# Patient Record
Sex: FEMALE | Race: WHITE
[De-identification: names, ages, dates, MRNs, and addresses within clinical notes are randomized per-mention and may not be internally consistent; named-entity substitution may affect disease eponyms.]

---

## 2018-01-04 ENCOUNTER — HOSPITAL ENCOUNTER (INPATIENT)
Dept: HOSPITAL 54 - ER | Age: 53
LOS: 5 days | Discharge: NURSING, REHAB OR LONG-TERM CARE | DRG: 481 | End: 2018-01-09
Attending: INTERNAL MEDICINE | Admitting: INTERNAL MEDICINE
Payer: COMMERCIAL

## 2018-01-04 VITALS — DIASTOLIC BLOOD PRESSURE: 99 MMHG | SYSTOLIC BLOOD PRESSURE: 145 MMHG

## 2018-01-04 VITALS — BODY MASS INDEX: 23.39 KG/M2 | WEIGHT: 137 LBS | HEIGHT: 64 IN
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
## 2018-01-04 DIAGNOSIS — F90.9: ICD-10-CM

## 2018-01-04 DIAGNOSIS — G89.29: ICD-10-CM
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
## 2018-01-04 DIAGNOSIS — N39.0: ICD-10-CM

## 2018-01-04 DIAGNOSIS — Z98.1: ICD-10-CM

## 2018-01-04 DIAGNOSIS — R79.89: ICD-10-CM

## 2018-01-04 DIAGNOSIS — D64.9: ICD-10-CM

## 2018-01-04 DIAGNOSIS — S72.144A: Primary | ICD-10-CM
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
## 2018-01-04 DIAGNOSIS — G82.20: ICD-10-CM

## 2018-01-04 DIAGNOSIS — F32.9: ICD-10-CM

## 2018-01-04 DIAGNOSIS — N31.9: ICD-10-CM

## 2018-01-04 DIAGNOSIS — F17.200: ICD-10-CM

## 2018-01-04 LAB
APTT PPP: 31 SEC (ref 23–34)
BASOPHILS # BLD AUTO: 0.1 /CMM (ref 0–0.2)
BASOPHILS NFR BLD AUTO: 1.5 % (ref 0–2)
BUN SERPL-MCNC: 6 MG/DL (ref 7–18)
CALCIUM SERPL-MCNC: 9.1 MG/DL (ref 8.5–10.1)
CHLORIDE SERPL-SCNC: 105 MMOL/L (ref 98–107)
CO2 SERPL-SCNC: 26 MMOL/L (ref 21–32)
CREAT SERPL-MCNC: 0.6 MG/DL (ref 0.6–1.3)
EOSINOPHIL # BLD AUTO: 0.2 /CMM (ref 0–0.7)
EOSINOPHIL NFR BLD AUTO: 2.1 % (ref 0–6)
GLUCOSE SERPL-MCNC: 119 MG/DL (ref 74–106)
HCT VFR BLD AUTO: 45 % (ref 33–45)
HGB BLD-MCNC: 15.1 G/DL (ref 11.5–14.8)
INR PPP: 1 (ref 0.87–1.13)
LYMPHOCYTES NFR BLD AUTO: 1.6 /CMM (ref 0.8–4.8)
LYMPHOCYTES NFR BLD AUTO: 17.3 % (ref 20–44)
MCH RBC QN AUTO: 30 PG (ref 26–33)
MCHC RBC AUTO-ENTMCNC: 34 G/DL (ref 31–36)
MCV RBC AUTO: 89 FL (ref 82–100)
MONOCYTES NFR BLD AUTO: 0.4 /CMM (ref 0.1–1.3)
MONOCYTES NFR BLD AUTO: 4.2 % (ref 2–12)
NEUTROPHILS # BLD AUTO: 7.1 /CMM (ref 1.8–8.9)
NEUTROPHILS NFR BLD AUTO: 74.9 % (ref 43–81)
PH UR STRIP: 8 [PH] (ref 5–8)
PLATELET # BLD AUTO: 195 /CMM (ref 150–450)
POTASSIUM SERPL-SCNC: 4 MMOL/L (ref 3.5–5.1)
PROTHROMBIN TIME: 10.4 SECS (ref 9.5–12.7)
RBC # BLD AUTO: 4.99 MIL/UL (ref 4–5.2)
RBC #/AREA URNS HPF: (no result) /HPF (ref 0–2)
RDW COEFFICIENT OF VARIATION: 13.4 (ref 11.5–15)
SODIUM SERPL-SCNC: 139 MMOL/L (ref 136–145)
UROBILINOGEN UR STRIP-MCNC: 0.2 EU/DL
WBC #/AREA URNS HPF: (no result) /HPF (ref 0–3)
WBC NRBC COR # BLD AUTO: 9.4 K/UL (ref 4.3–11)

## 2018-01-04 PROCEDURE — A4606 OXYGEN PROBE USED W OXIMETER: HCPCS

## 2018-01-04 PROCEDURE — A6402 STERILE GAUZE <= 16 SQ IN: HCPCS

## 2018-01-04 PROCEDURE — A6209 FOAM DRSG <=16 SQ IN W/O BDR: HCPCS

## 2018-01-04 PROCEDURE — C1713 ANCHOR/SCREW BN/BN,TIS/BN: HCPCS

## 2018-01-04 PROCEDURE — Z7610: HCPCS

## 2018-01-04 PROCEDURE — A4216 STERILE WATER/SALINE, 10 ML: HCPCS

## 2018-01-04 RX ADMIN — NICOTINE SCH MG: 21 PATCH TRANSDERMAL at 23:30

## 2018-01-04 NOTE — NUR
RN NOTES

RECEIVED PATIENT FROM ER FOR DX RIGHT HIP FRACTURE. PATIENT AO X 3, ABLE TO MAKE NEEDS 
KNOWN. NO ACUTE DISTRESS NOTED. PAIN 8/10 ON RIGHT HIP WHEN DURING TRANSFER ONTO BED. IV 
SITE PATENT, INTACT; FLUSHED. SUH CATHETER PATENT, INTACT; DRAINING CLEAR, YELLOW URINE. 
ON LOW BED WITH BILATERAL UPPER SIDE RAILS UP. CALL BELL WITHIN EASY REACH. WAITING FOR 
ADMISSION ORDERS. WILL CONTINUE TO MONITOR PATIENT.

## 2018-01-05 VITALS — SYSTOLIC BLOOD PRESSURE: 118 MMHG | DIASTOLIC BLOOD PRESSURE: 72 MMHG

## 2018-01-05 VITALS — SYSTOLIC BLOOD PRESSURE: 136 MMHG | DIASTOLIC BLOOD PRESSURE: 84 MMHG

## 2018-01-05 VITALS — SYSTOLIC BLOOD PRESSURE: 114 MMHG | DIASTOLIC BLOOD PRESSURE: 70 MMHG

## 2018-01-05 LAB
BASOPHILS # BLD AUTO: 0 /CMM (ref 0–0.2)
BASOPHILS NFR BLD AUTO: 0.3 % (ref 0–2)
BUN SERPL-MCNC: 6 MG/DL (ref 7–18)
CALCIUM SERPL-MCNC: 8.9 MG/DL (ref 8.5–10.1)
CHLORIDE SERPL-SCNC: 105 MMOL/L (ref 98–107)
CHOLEST SERPL-MCNC: 135 MG/DL (ref ?–200)
CO2 SERPL-SCNC: 26 MMOL/L (ref 21–32)
CREAT SERPL-MCNC: 0.6 MG/DL (ref 0.6–1.3)
EOSINOPHIL # BLD AUTO: 0.3 /CMM (ref 0–0.7)
EOSINOPHIL NFR BLD AUTO: 4.7 % (ref 0–6)
GLUCOSE SERPL-MCNC: 128 MG/DL (ref 74–106)
HCT VFR BLD AUTO: 39 % (ref 33–45)
HDLC SERPL-MCNC: 34 MG/DL (ref 40–60)
HGB BLD-MCNC: 13.5 G/DL (ref 11.5–14.8)
INR PPP: 0.98 (ref 0.87–1.13)
LDLC SERPL DIRECT ASSAY-MCNC: 90 MG/DL (ref 0–99)
LYMPHOCYTES NFR BLD AUTO: 2.3 /CMM (ref 0.8–4.8)
LYMPHOCYTES NFR BLD AUTO: 33 % (ref 20–44)
MAGNESIUM SERPL-MCNC: 1.7 MG/DL (ref 1.8–2.4)
MAGNESIUM SERPL-MCNC: 1.8 MG/DL (ref 1.8–2.4)
MCH RBC QN AUTO: 31 PG (ref 26–33)
MCHC RBC AUTO-ENTMCNC: 35 G/DL (ref 31–36)
MCV RBC AUTO: 90 FL (ref 82–100)
MONOCYTES NFR BLD AUTO: 0.5 /CMM (ref 0.1–1.3)
MONOCYTES NFR BLD AUTO: 7.8 % (ref 2–12)
NEUTROPHILS # BLD AUTO: 3.8 /CMM (ref 1.8–8.9)
NEUTROPHILS NFR BLD AUTO: 54.2 % (ref 43–81)
PHOSPHATE SERPL-MCNC: 3.9 MG/DL (ref 2.5–4.9)
PHOSPHATE SERPL-MCNC: 4 MG/DL (ref 2.5–4.9)
PLATELET # BLD AUTO: 168 /CMM (ref 150–450)
POTASSIUM SERPL-SCNC: 3.8 MMOL/L (ref 3.5–5.1)
PROTHROMBIN TIME: 10.2 SECS (ref 9.5–12.7)
RBC # BLD AUTO: 4.33 MIL/UL (ref 4–5.2)
RDW COEFFICIENT OF VARIATION: 14.7 (ref 11.5–15)
SODIUM SERPL-SCNC: 140 MMOL/L (ref 136–145)
TRIGL SERPL-MCNC: 96 MG/DL (ref 30–150)
TSH SERPL DL<=0.005 MIU/L-ACNC: 1.88 UIU/ML (ref 0.36–3.74)
WBC NRBC COR # BLD AUTO: 7.1 K/UL (ref 4.3–11)

## 2018-01-05 RX ADMIN — TIZANIDINE HYDROCHLORIDE SCH MG: 4 TABLET ORAL at 09:02

## 2018-01-05 RX ADMIN — Medication SCH MG: at 09:01

## 2018-01-05 RX ADMIN — HYDROMORPHONE HYDROCHLORIDE PRN MG: 1 INJECTION, SOLUTION INTRAMUSCULAR; INTRAVENOUS; SUBCUTANEOUS at 23:11

## 2018-01-05 RX ADMIN — BACLOFEN SCH MG: 10 TABLET ORAL at 17:05

## 2018-01-05 RX ADMIN — HYDROMORPHONE HYDROCHLORIDE PRN MG: 1 INJECTION, SOLUTION INTRAMUSCULAR; INTRAVENOUS; SUBCUTANEOUS at 02:10

## 2018-01-05 RX ADMIN — Medication SCH MG: at 17:05

## 2018-01-05 RX ADMIN — BACLOFEN SCH MG: 10 TABLET ORAL at 12:35

## 2018-01-05 RX ADMIN — BACLOFEN SCH MG: 10 TABLET ORAL at 09:01

## 2018-01-05 RX ADMIN — MAGNESIUM SULFATE IN DEXTROSE SCH MLS/HR: 10 INJECTION, SOLUTION INTRAVENOUS at 17:10

## 2018-01-05 RX ADMIN — DEXTROSE MONOHYDRATE SCH MLS/HR: 50 INJECTION, SOLUTION INTRAVENOUS at 22:54

## 2018-01-05 RX ADMIN — HYDROMORPHONE HYDROCHLORIDE PRN MG: 1 INJECTION, SOLUTION INTRAMUSCULAR; INTRAVENOUS; SUBCUTANEOUS at 06:10

## 2018-01-05 RX ADMIN — HYDROMORPHONE HYDROCHLORIDE PRN MG: 1 INJECTION, SOLUTION INTRAMUSCULAR; INTRAVENOUS; SUBCUTANEOUS at 13:01

## 2018-01-05 RX ADMIN — MAGNESIUM SULFATE IN DEXTROSE SCH MLS/HR: 10 INJECTION, SOLUTION INTRAVENOUS at 16:15

## 2018-01-05 RX ADMIN — PANTOPRAZOLE SODIUM SCH MG: 40 TABLET, DELAYED RELEASE ORAL at 09:00

## 2018-01-05 RX ADMIN — THERA TABS SCH UDTAB: TAB at 09:01

## 2018-01-05 RX ADMIN — HYDROMORPHONE HYDROCHLORIDE PRN MG: 1 INJECTION, SOLUTION INTRAMUSCULAR; INTRAVENOUS; SUBCUTANEOUS at 17:05

## 2018-01-05 RX ADMIN — HYDROMORPHONE HYDROCHLORIDE PRN MG: 1 INJECTION, SOLUTION INTRAMUSCULAR; INTRAVENOUS; SUBCUTANEOUS at 19:08

## 2018-01-05 RX ADMIN — NICOTINE SCH MG: 21 PATCH TRANSDERMAL at 09:02

## 2018-01-05 RX ADMIN — TIZANIDINE HYDROCHLORIDE SCH MG: 4 TABLET ORAL at 12:35

## 2018-01-05 RX ADMIN — TIZANIDINE HYDROCHLORIDE SCH MG: 4 TABLET ORAL at 17:05

## 2018-01-05 RX ADMIN — HYDROMORPHONE HYDROCHLORIDE PRN MG: 1 INJECTION, SOLUTION INTRAMUSCULAR; INTRAVENOUS; SUBCUTANEOUS at 15:07

## 2018-01-05 RX ADMIN — HYDROMORPHONE HYDROCHLORIDE PRN MG: 1 INJECTION, SOLUTION INTRAMUSCULAR; INTRAVENOUS; SUBCUTANEOUS at 21:13

## 2018-01-05 RX ADMIN — HYDROMORPHONE HYDROCHLORIDE PRN MG: 1 INJECTION, SOLUTION INTRAMUSCULAR; INTRAVENOUS; SUBCUTANEOUS at 10:12

## 2018-01-05 NOTE — NUR
ms/rn notes

patient reported pain level of 8/10 on right hip will give as needed pain med, will continue 
to monitor.

## 2018-01-05 NOTE — NUR
RN MS INITIAL NOTES



RECEIVED PT LAYING IN BED. A/OX4, AWAKE ALERT AND RESPONSIVE. RESPIRATIONS ARE EVEN AND 
UNLABORED, NOT IN ANY ACUTE DISTRESS NOTED. C/O PAIN TO RIGHT HIP, WAITING FOR PHARMACY TO 
VERIFIY MEDICATIONS. PT IS AWARE. WILL CONTINUE TO MONITOR AND REPOSITION FOR COMFORT. 
REMINDED PT TO USE CALL LIGHT WHEN ASSISTANCE IS NEEDED. CALL LIGHT IS LEFT WITHIN REACH OF 
PT.

## 2018-01-05 NOTE — NUR
ms/rn notes

PATIENT PAIN MANAGEMENT MONITORING, ALERT, ORIENTED X3, ABLE TO VERBALIZE NEEDS, REPORTED 
PAIN LEVEL OF 8/10 , ADMINISTER DILAUDID IVP WITH ORDERED DOSE, WILL MONITOR EFFECTIVENESS.

## 2018-01-05 NOTE — NUR
MS/RN NOTES

PATIENT REPORTED NICOTINE PATCH REMOVED ACCIDENTALLY APPEARED AGITATED AND REQUESTING FOR 
MED TO BE APPLIED, MD CONTACTED AND ORDER FOR A ONE TIME PATCH TO BE GIVEN. ORDER TO CARRIED 
OUT/

## 2018-01-05 NOTE — NUR
RN NOTES

PATIENT WITH EYES CLOSED, AROUSABLE. RESPIRATIONS EVEN. DILAUDID IV GIVEN FOR RIGHT HIP 
PAIN. DUE MED GIVEN WITH NO ASE NOTED. NEEDS ATTENDED. HIP PRECAUTION MAINTAINED. SAFETY 
PRECAUTIONS AND COMFORT MEASURES IN PLACE. WILL GIVE REPORT TO DAY SHIFT FOR CONTINUITY OF 
CARE.

## 2018-01-05 NOTE — NUR
ms/rn opening notes

received report from am rn, patient, alert, oriented x3, able to verbalize needs, 
respirations even and unlabored, skin warm to touch, require assistance at al times. 
informed about procedure at 7am in am, consent form has been signed, check list will be 
provided. bed in lock position. Call lights within reach. Will continue to monitor.

## 2018-01-05 NOTE — NUR
RN MS CLOSING NOTES



KARY GAN PUT ORDERS FOR SURGERY TOMORROW FOR RIGHT HIP INTRAMEDULLARY RODDING. PT SIGNED 
CONSENTS AND IS EXCITED FOR SURGERY TOMORROW. ALL DUE MEDS GIVEN, NEEDS MET AND RENDERED. 
RESPIRATIONS ARE EVEN AND UNLABORED, NOT IN ANY ACUTE DISTRESS NOTED. IV TO LEFT WRIST IS 
PATENT, DRESSING KEPT CLEAN AND DRY. MAGNESIUM REPLACEMENT DONE WITH NO ASE NOTED. PT KEPT 
COMFORTABLE, UNABLE TO MOVE A LOT DUE TO RIGHT HIP FX. PT STATED SHE IS HAPPY WITH CARE AND 
CANT WAIT TO GO HOME. ENDORSED TO NEXT SHIFT FOR CONTINTUITY OF CARE.

## 2018-01-05 NOTE — NUR
RN NOTES



PT REQUESTED FOR ADDERALL, PHARMACY DOES NOT DISPENSE ADDERALL. SPOKE W/ DR. HERNANDEZ AND 
STATED THAT "PATIENT WILL NEED TO BRING HER HOME MEDICATION." PATIENT MADE AWARE AND STATED 
"THATS OKAY. I WILL KEEP MY ADDERALL AT HOME." ALVERTO PRESTON OF DR. THAO CAME IN TO SEE THE 
PATIENT AND STATED HE WILL PUT ORDERS FOR SURGERY TOMORROW. PT MADE AWARE AND AGREED.

## 2018-01-06 VITALS — SYSTOLIC BLOOD PRESSURE: 120 MMHG | DIASTOLIC BLOOD PRESSURE: 80 MMHG

## 2018-01-06 VITALS — SYSTOLIC BLOOD PRESSURE: 119 MMHG | DIASTOLIC BLOOD PRESSURE: 80 MMHG

## 2018-01-06 VITALS — DIASTOLIC BLOOD PRESSURE: 56 MMHG | SYSTOLIC BLOOD PRESSURE: 97 MMHG

## 2018-01-06 VITALS — DIASTOLIC BLOOD PRESSURE: 83 MMHG | SYSTOLIC BLOOD PRESSURE: 121 MMHG

## 2018-01-06 VITALS — DIASTOLIC BLOOD PRESSURE: 85 MMHG | SYSTOLIC BLOOD PRESSURE: 121 MMHG

## 2018-01-06 VITALS — SYSTOLIC BLOOD PRESSURE: 100 MMHG | DIASTOLIC BLOOD PRESSURE: 57 MMHG

## 2018-01-06 VITALS — SYSTOLIC BLOOD PRESSURE: 121 MMHG | DIASTOLIC BLOOD PRESSURE: 85 MMHG

## 2018-01-06 VITALS — SYSTOLIC BLOOD PRESSURE: 123 MMHG | DIASTOLIC BLOOD PRESSURE: 85 MMHG

## 2018-01-06 VITALS — SYSTOLIC BLOOD PRESSURE: 118 MMHG | DIASTOLIC BLOOD PRESSURE: 79 MMHG

## 2018-01-06 LAB
BUN SERPL-MCNC: 5 MG/DL (ref 7–18)
CALCIUM SERPL-MCNC: 8.8 MG/DL (ref 8.5–10.1)
CHLORIDE SERPL-SCNC: 103 MMOL/L (ref 98–107)
CO2 SERPL-SCNC: 25 MMOL/L (ref 21–32)
CREAT SERPL-MCNC: 0.5 MG/DL (ref 0.6–1.3)
GLUCOSE SERPL-MCNC: 107 MG/DL (ref 74–106)
HCT VFR BLD AUTO: 39 % (ref 33–45)
HGB BLD-MCNC: 13.3 G/DL (ref 11.5–14.8)
MAGNESIUM SERPL-MCNC: 1.9 MG/DL (ref 1.8–2.4)
POTASSIUM SERPL-SCNC: 3.6 MMOL/L (ref 3.5–5.1)
SODIUM SERPL-SCNC: 139 MMOL/L (ref 136–145)

## 2018-01-06 PROCEDURE — 0QS604Z REPOSITION RIGHT UPPER FEMUR WITH INTERNAL FIXATION DEVICE, OPEN APPROACH: ICD-10-PCS

## 2018-01-06 RX ADMIN — Medication PRN MG: at 16:24

## 2018-01-06 RX ADMIN — HYDROMORPHONE HYDROCHLORIDE PRN MG: 2 INJECTION, SOLUTION INTRAMUSCULAR; INTRAVENOUS; SUBCUTANEOUS at 19:57

## 2018-01-06 RX ADMIN — HYDROMORPHONE HYDROCHLORIDE PRN MG: 2 INJECTION, SOLUTION INTRAMUSCULAR; INTRAVENOUS; SUBCUTANEOUS at 10:48

## 2018-01-06 RX ADMIN — Medication PRN MG: at 19:57

## 2018-01-06 RX ADMIN — Medication SCH MG: at 16:23

## 2018-01-06 RX ADMIN — HYDROMORPHONE HYDROCHLORIDE PRN MG: 1 INJECTION, SOLUTION INTRAMUSCULAR; INTRAVENOUS; SUBCUTANEOUS at 01:09

## 2018-01-06 RX ADMIN — BACLOFEN SCH MG: 10 TABLET ORAL at 12:02

## 2018-01-06 RX ADMIN — HYDROMORPHONE HYDROCHLORIDE PRN MG: 2 INJECTION, SOLUTION INTRAMUSCULAR; INTRAVENOUS; SUBCUTANEOUS at 22:30

## 2018-01-06 RX ADMIN — TIZANIDINE HYDROCHLORIDE SCH MG: 4 TABLET ORAL at 12:03

## 2018-01-06 RX ADMIN — DEXTROSE MONOHYDRATE SCH MLS/HR: 50 INJECTION, SOLUTION INTRAVENOUS at 16:50

## 2018-01-06 RX ADMIN — Medication PRN MG: at 22:38

## 2018-01-06 RX ADMIN — Medication SCH MG: at 10:49

## 2018-01-06 RX ADMIN — DEXTROSE MONOHYDRATE SCH MLS/HR: 50 INJECTION, SOLUTION INTRAVENOUS at 22:38

## 2018-01-06 RX ADMIN — Medication SCH MG: at 09:00

## 2018-01-06 RX ADMIN — TIZANIDINE HYDROCHLORIDE SCH MG: 4 TABLET ORAL at 09:27

## 2018-01-06 RX ADMIN — Medication PRN MG: at 09:34

## 2018-01-06 RX ADMIN — THERA TABS SCH UDTAB: TAB at 10:49

## 2018-01-06 RX ADMIN — HYDROMORPHONE HYDROCHLORIDE PRN MG: 2 INJECTION, SOLUTION INTRAMUSCULAR; INTRAVENOUS; SUBCUTANEOUS at 15:22

## 2018-01-06 RX ADMIN — TIZANIDINE HYDROCHLORIDE SCH MG: 4 TABLET ORAL at 16:23

## 2018-01-06 RX ADMIN — NICOTINE SCH MG: 21 PATCH TRANSDERMAL at 10:49

## 2018-01-06 RX ADMIN — BACLOFEN SCH MG: 10 TABLET ORAL at 16:23

## 2018-01-06 RX ADMIN — PANTOPRAZOLE SODIUM SCH MG: 40 TABLET, DELAYED RELEASE ORAL at 10:49

## 2018-01-06 RX ADMIN — BACLOFEN SCH MG: 10 TABLET ORAL at 09:28

## 2018-01-06 NOTE — NUR
RECEIVED A CALL THAT THE PATIENT IS POSITIVE FOR MRSA OF THE NARES. CALLED MD AND PLACED 
ORDER FOR BACTROBAN OINT. PT PLACED ON ISOLATION.

## 2018-01-06 NOTE — NUR
MSRN

MULTIPLE NEEDS, ON FREQ PAIN MED.  STATED INCREASING ANXIETY WHEN PAIN MED NOT GIVEN ON 
TIME.  ABLE TO POSITION SELF PER COMFORT WITH MIN ASSIST.  OTHER DUE MEDS GIVEN. INSISTED TO 
HAVE ANOTHER DOSE OF ZANAFLEX AND BACLOFEN.  ORDERS RECEIVED FROM DR. LELA TUCKER.

## 2018-01-06 NOTE — NUR
ms/rn notes

OR NURSE CAME AND  PATIENT IN BED, PREPARED CHECKLIST, CITAL SIGNS CHECK, B/P 130/73, 
PULSE 92, O2 SAT ROOM AIR AT 96%, TEMP AT 97.8, VERBALIZED PAIN OF 8/10. , SUH EMPTIED, 
BELONGINGS,KEPT BY BEDSIDE,  TO . PATIENT ALERT, ORIENTED. ASSISTED FOR 
SAFETY.

## 2018-01-06 NOTE — NUR
MSRN

FULLY AWAKE,  AT BEDSIDE.  PLAN OF CARE AND MEDICATION REGIMEN [place] PAIN MGT DISCUSSED 
WITH PATIENT, APPEARS TO UNDERSTAND.  WILL CALL IF NEEDED.  HL PATENT.  TO CONTINUE.  RIGHT 
HIP DRESSING D/I. FC TO GRAVITY GOOD OUTPUT

## 2018-01-06 NOTE — NUR
CALLED PHARMACY AS PT CLINDAMYCIN IS MISSING. PHARMACIST PAULA STATES SHE WILL SEND THE 
MEDICATION.

## 2018-01-06 NOTE — NUR
NO SIGNIFICANT CHANGES IN PATIENT CONDITION THROUGHOUT THE SHIFT. NO SOB OR DISTRESS NOTED 
AT THIS TIME. PATIENT REPORTS TOLERABLE PAIN. BED IN A LOW POSITION, CALL LIGHT WITHIN 
PATIENT REACH. WILL ENDORSE FOR LAVONNE.

## 2018-01-06 NOTE — NUR
PT RECEIVED FROM SURGERY. NO SOB OR DISTRESS NOTED AT THIS TIME. PATIENT REPORTS 10/10 PAIN. 
OR GAVE ALL MEDICATIONS THEY WERE ABLE. WILL FAX RX ORDERS AND FOLLOW UP AS SOON AS 
POSSIBLE. VITALS CHECKED AND RECORDED. WILL CONTINUE TO MONITOR.

## 2018-01-07 VITALS — SYSTOLIC BLOOD PRESSURE: 90 MMHG | DIASTOLIC BLOOD PRESSURE: 57 MMHG

## 2018-01-07 VITALS — SYSTOLIC BLOOD PRESSURE: 95 MMHG | DIASTOLIC BLOOD PRESSURE: 47 MMHG

## 2018-01-07 LAB
BASOPHILS # BLD AUTO: 0 /CMM (ref 0–0.2)
BASOPHILS NFR BLD AUTO: 0.4 % (ref 0–2)
BUN SERPL-MCNC: 7 MG/DL (ref 7–18)
CALCIUM SERPL-MCNC: 8.1 MG/DL (ref 8.5–10.1)
CHLORIDE SERPL-SCNC: 107 MMOL/L (ref 98–107)
CO2 SERPL-SCNC: 24 MMOL/L (ref 21–32)
CREAT SERPL-MCNC: 0.5 MG/DL (ref 0.6–1.3)
EOSINOPHIL # BLD AUTO: 0.2 /CMM (ref 0–0.7)
EOSINOPHIL NFR BLD AUTO: 2.3 % (ref 0–6)
GLUCOSE SERPL-MCNC: 126 MG/DL (ref 74–106)
HCT VFR BLD AUTO: 26 % (ref 33–45)
HGB BLD-MCNC: 9 G/DL (ref 11.5–14.8)
LYMPHOCYTES NFR BLD AUTO: 2.6 /CMM (ref 0.8–4.8)
LYMPHOCYTES NFR BLD AUTO: 28.9 % (ref 20–44)
MCH RBC QN AUTO: 31 PG (ref 26–33)
MCHC RBC AUTO-ENTMCNC: 35 G/DL (ref 31–36)
MCV RBC AUTO: 91 FL (ref 82–100)
MONOCYTES NFR BLD AUTO: 0.8 /CMM (ref 0.1–1.3)
MONOCYTES NFR BLD AUTO: 9.1 % (ref 2–12)
NEUTROPHILS # BLD AUTO: 5.3 /CMM (ref 1.8–8.9)
NEUTROPHILS NFR BLD AUTO: 59.3 % (ref 43–81)
PLATELET # BLD AUTO: 131 /CMM (ref 150–450)
POTASSIUM SERPL-SCNC: 3.6 MMOL/L (ref 3.5–5.1)
RBC # BLD AUTO: 2.88 MIL/UL (ref 4–5.2)
RDW COEFFICIENT OF VARIATION: 13.9 (ref 11.5–15)
SODIUM SERPL-SCNC: 138 MMOL/L (ref 136–145)
WBC NRBC COR # BLD AUTO: 9 K/UL (ref 4.3–11)

## 2018-01-07 RX ADMIN — BACLOFEN SCH MG: 10 TABLET ORAL at 00:10

## 2018-01-07 RX ADMIN — MUPIROCIN SCH GM: 20 OINTMENT TOPICAL at 00:07

## 2018-01-07 RX ADMIN — HYDROMORPHONE HYDROCHLORIDE PRN MG: 2 INJECTION, SOLUTION INTRAMUSCULAR; INTRAVENOUS; SUBCUTANEOUS at 08:37

## 2018-01-07 RX ADMIN — PANTOPRAZOLE SODIUM SCH MG: 40 TABLET, DELAYED RELEASE ORAL at 08:43

## 2018-01-07 RX ADMIN — Medication PRN MG: at 10:05

## 2018-01-07 RX ADMIN — RIVAROXABAN SCH MG: 10 TABLET, FILM COATED ORAL at 08:46

## 2018-01-07 RX ADMIN — HYDROMORPHONE HYDROCHLORIDE PRN MG: 2 INJECTION, SOLUTION INTRAMUSCULAR; INTRAVENOUS; SUBCUTANEOUS at 16:36

## 2018-01-07 RX ADMIN — HYDROMORPHONE HYDROCHLORIDE PRN MG: 2 INJECTION, SOLUTION INTRAMUSCULAR; INTRAVENOUS; SUBCUTANEOUS at 12:35

## 2018-01-07 RX ADMIN — TIZANIDINE HYDROCHLORIDE SCH MG: 4 TABLET ORAL at 08:37

## 2018-01-07 RX ADMIN — Medication PRN MG: at 17:49

## 2018-01-07 RX ADMIN — THERA TABS SCH UDTAB: TAB at 08:37

## 2018-01-07 RX ADMIN — Medication PRN MG: at 20:53

## 2018-01-07 RX ADMIN — SODIUM CHLORIDE SCH MLS/HR: 9 INJECTION, SOLUTION INTRAVENOUS at 14:33

## 2018-01-07 RX ADMIN — Medication SCH MG: at 08:38

## 2018-01-07 RX ADMIN — MUPIROCIN SCH GM: 20 OINTMENT TOPICAL at 21:02

## 2018-01-07 RX ADMIN — HYDROMORPHONE HYDROCHLORIDE PRN MG: 2 INJECTION, SOLUTION INTRAMUSCULAR; INTRAVENOUS; SUBCUTANEOUS at 02:01

## 2018-01-07 RX ADMIN — TIZANIDINE HYDROCHLORIDE SCH MG: 4 TABLET ORAL at 12:35

## 2018-01-07 RX ADMIN — DEXTROSE MONOHYDRATE SCH MLS/HR: 50 INJECTION, SOLUTION INTRAVENOUS at 22:53

## 2018-01-07 RX ADMIN — MUPIROCIN SCH GM: 20 OINTMENT TOPICAL at 08:42

## 2018-01-07 RX ADMIN — BACLOFEN SCH MG: 10 TABLET ORAL at 08:46

## 2018-01-07 RX ADMIN — DEXTROSE MONOHYDRATE SCH MLS/HR: 50 INJECTION, SOLUTION INTRAVENOUS at 00:00

## 2018-01-07 RX ADMIN — Medication SCH MG: at 16:35

## 2018-01-07 RX ADMIN — NICOTINE SCH MG: 21 PATCH TRANSDERMAL at 08:43

## 2018-01-07 RX ADMIN — HYDROMORPHONE HYDROCHLORIDE PRN MG: 2 INJECTION, SOLUTION INTRAMUSCULAR; INTRAVENOUS; SUBCUTANEOUS at 18:51

## 2018-01-07 RX ADMIN — BACLOFEN SCH MG: 10 TABLET ORAL at 20:53

## 2018-01-07 RX ADMIN — BACLOFEN SCH MG: 10 TABLET ORAL at 12:35

## 2018-01-07 RX ADMIN — DEXTROSE MONOHYDRATE SCH MLS/HR: 50 INJECTION, SOLUTION INTRAVENOUS at 10:05

## 2018-01-07 RX ADMIN — Medication SCH MG: at 08:43

## 2018-01-07 RX ADMIN — HYDROMORPHONE HYDROCHLORIDE PRN MG: 2 INJECTION, SOLUTION INTRAMUSCULAR; INTRAVENOUS; SUBCUTANEOUS at 20:54

## 2018-01-07 RX ADMIN — BACLOFEN SCH MG: 10 TABLET ORAL at 16:35

## 2018-01-07 RX ADMIN — TIZANIDINE HYDROCHLORIDE SCH MG: 4 TABLET ORAL at 16:35

## 2018-01-07 RX ADMIN — HYDROMORPHONE HYDROCHLORIDE PRN MG: 2 INJECTION, SOLUTION INTRAMUSCULAR; INTRAVENOUS; SUBCUTANEOUS at 06:33

## 2018-01-07 NOTE — NUR
MSRN

IN GOOD SPIRITS, STATED EAGER TO SEE PHYSICAL THERAPIST IN AM AND WILL TRY TO INCREASE HER 
ACTIVITY AND PARTICIPATION.  HS CARE DONE, ABLE TO REPOSITION SELF FOR COMFORT.  ALL NEEDS 
MADE, CONTINUED.

## 2018-01-08 VITALS — DIASTOLIC BLOOD PRESSURE: 82 MMHG | SYSTOLIC BLOOD PRESSURE: 123 MMHG

## 2018-01-08 VITALS — DIASTOLIC BLOOD PRESSURE: 69 MMHG | SYSTOLIC BLOOD PRESSURE: 126 MMHG

## 2018-01-08 VITALS — DIASTOLIC BLOOD PRESSURE: 71 MMHG | SYSTOLIC BLOOD PRESSURE: 116 MMHG

## 2018-01-08 LAB
BASOPHILS # BLD AUTO: 0 /CMM (ref 0–0.2)
BASOPHILS NFR BLD AUTO: 0.3 % (ref 0–2)
BUN SERPL-MCNC: 5 MG/DL (ref 7–18)
CALCIUM SERPL-MCNC: 8.5 MG/DL (ref 8.5–10.1)
CHLORIDE SERPL-SCNC: 106 MMOL/L (ref 98–107)
CO2 SERPL-SCNC: 24 MMOL/L (ref 21–32)
CREAT SERPL-MCNC: 0.5 MG/DL (ref 0.6–1.3)
EOSINOPHIL # BLD AUTO: 0.3 /CMM (ref 0–0.7)
EOSINOPHIL NFR BLD AUTO: 4.2 % (ref 0–6)
GLUCOSE SERPL-MCNC: 116 MG/DL (ref 74–106)
HCT VFR BLD AUTO: 26 % (ref 33–45)
HGB BLD-MCNC: 9 G/DL (ref 11.5–14.8)
LYMPHOCYTES NFR BLD AUTO: 1.9 /CMM (ref 0.8–4.8)
LYMPHOCYTES NFR BLD AUTO: 24.9 % (ref 20–44)
MCH RBC QN AUTO: 31 PG (ref 26–33)
MCHC RBC AUTO-ENTMCNC: 35 G/DL (ref 31–36)
MCV RBC AUTO: 90 FL (ref 82–100)
MONOCYTES NFR BLD AUTO: 0.6 /CMM (ref 0.1–1.3)
MONOCYTES NFR BLD AUTO: 7.4 % (ref 2–12)
NEUTROPHILS # BLD AUTO: 4.8 /CMM (ref 1.8–8.9)
NEUTROPHILS NFR BLD AUTO: 63.2 % (ref 43–81)
PLATELET # BLD AUTO: 161 /CMM (ref 150–450)
POTASSIUM SERPL-SCNC: 3.6 MMOL/L (ref 3.5–5.1)
RBC # BLD AUTO: 2.87 MIL/UL (ref 4–5.2)
RDW COEFFICIENT OF VARIATION: 14 (ref 11.5–15)
SODIUM SERPL-SCNC: 140 MMOL/L (ref 136–145)
WBC NRBC COR # BLD AUTO: 7.7 K/UL (ref 4.3–11)

## 2018-01-08 RX ADMIN — THERA TABS SCH UDTAB: TAB at 08:30

## 2018-01-08 RX ADMIN — Medication SCH MG: at 08:30

## 2018-01-08 RX ADMIN — HYDROMORPHONE HYDROCHLORIDE PRN MG: 2 INJECTION, SOLUTION INTRAMUSCULAR; INTRAVENOUS; SUBCUTANEOUS at 13:21

## 2018-01-08 RX ADMIN — RIVAROXABAN SCH MG: 10 TABLET, FILM COATED ORAL at 17:55

## 2018-01-08 RX ADMIN — Medication PRN MG: at 20:38

## 2018-01-08 RX ADMIN — DEXTROSE MONOHYDRATE SCH MLS/HR: 50 INJECTION, SOLUTION INTRAVENOUS at 22:41

## 2018-01-08 RX ADMIN — TIZANIDINE HYDROCHLORIDE SCH MG: 4 TABLET ORAL at 08:30

## 2018-01-08 RX ADMIN — Medication PRN MG: at 00:08

## 2018-01-08 RX ADMIN — HYDROMORPHONE HYDROCHLORIDE PRN MG: 2 INJECTION, SOLUTION INTRAMUSCULAR; INTRAVENOUS; SUBCUTANEOUS at 01:46

## 2018-01-08 RX ADMIN — HYDROMORPHONE HYDROCHLORIDE PRN MG: 2 INJECTION, SOLUTION INTRAMUSCULAR; INTRAVENOUS; SUBCUTANEOUS at 20:27

## 2018-01-08 RX ADMIN — TIZANIDINE HYDROCHLORIDE SCH MG: 4 TABLET ORAL at 13:21

## 2018-01-08 RX ADMIN — BACLOFEN SCH MG: 10 TABLET ORAL at 20:27

## 2018-01-08 RX ADMIN — Medication SCH MG: at 17:45

## 2018-01-08 RX ADMIN — HYDROMORPHONE HYDROCHLORIDE PRN MG: 2 INJECTION, SOLUTION INTRAMUSCULAR; INTRAVENOUS; SUBCUTANEOUS at 17:45

## 2018-01-08 RX ADMIN — TIZANIDINE HYDROCHLORIDE SCH MG: 4 TABLET ORAL at 17:45

## 2018-01-08 RX ADMIN — BACLOFEN SCH MG: 10 TABLET ORAL at 08:30

## 2018-01-08 RX ADMIN — SODIUM CHLORIDE SCH MLS/HR: 9 INJECTION, SOLUTION INTRAVENOUS at 15:17

## 2018-01-08 RX ADMIN — MUPIROCIN SCH APPLIC: 20 OINTMENT TOPICAL at 20:33

## 2018-01-08 RX ADMIN — HYDROMORPHONE HYDROCHLORIDE PRN MG: 2 INJECTION, SOLUTION INTRAMUSCULAR; INTRAVENOUS; SUBCUTANEOUS at 11:03

## 2018-01-08 RX ADMIN — Medication PRN MG: at 21:43

## 2018-01-08 RX ADMIN — HYDROMORPHONE HYDROCHLORIDE PRN MG: 2 INJECTION, SOLUTION INTRAMUSCULAR; INTRAVENOUS; SUBCUTANEOUS at 22:41

## 2018-01-08 RX ADMIN — BACLOFEN SCH MG: 10 TABLET ORAL at 13:20

## 2018-01-08 RX ADMIN — PANTOPRAZOLE SODIUM SCH MG: 40 TABLET, DELAYED RELEASE ORAL at 08:30

## 2018-01-08 RX ADMIN — BACLOFEN SCH MG: 10 TABLET ORAL at 17:45

## 2018-01-08 RX ADMIN — HYDROMORPHONE HYDROCHLORIDE PRN MG: 2 INJECTION, SOLUTION INTRAMUSCULAR; INTRAVENOUS; SUBCUTANEOUS at 08:31

## 2018-01-08 RX ADMIN — HYDROMORPHONE HYDROCHLORIDE PRN MG: 2 INJECTION, SOLUTION INTRAMUSCULAR; INTRAVENOUS; SUBCUTANEOUS at 15:24

## 2018-01-08 RX ADMIN — NICOTINE SCH MG: 21 PATCH TRANSDERMAL at 08:30

## 2018-01-08 RX ADMIN — MUPIROCIN SCH GM: 20 OINTMENT TOPICAL at 08:40

## 2018-01-08 NOTE — NUR
RN NOTES

PATIENT ALERT AND ORIENTED X4, NO DISTRESS NOTED, DENIES PAIN AT THIS TIME, PATIENT WORKED 
WITH PHYSICAL THERAPY TODAY. ALL NEEDS ATTENDED AND MET, PATIENT ABLE TO HELP WITH 
REPOSITIONING. CALL LIGHT WITHIN REACH, WILL ENDORSE TO NIGHT SHIFT FOR LAVONNE.

## 2018-01-08 NOTE — NUR
MSRN

SLEEPING APPEARS COMFORTABLE.  HAS BEEN COMPLAINING OF RIGHT HIP PAIN Q 2 TO 3 HOURS LAST 
NIGHT,  HAS BEEN MEDICATED WITH DILAUDED IV AND OXY IR IN BETWEEN.  LESS ANXIOUS FROM LAST 
NIGHT. CLOSELY WATCHED

## 2018-01-08 NOTE — NUR
MS RN NOTE:



PATIENT COMPLAINS OF PAIN TO RIGHT HIP/LEG, OXY IR 5MG ORAL GIVEN PER MD ORDER. PATIENT ALSO 
REQUEST FOR STOOL SOFTENER AND THAT NEEDS BOTH COLACE AND SENAKOT TOGETHER SINCE SHE HAS 
RECTAL PROLAPSE. COLACE AND SENAKOT ORAL GIVEN PER MD ORDER. WILL CONTINUE TO MONITOR.

## 2018-01-08 NOTE — NUR
MS RN NOTE:



PATIENT RESTING IN BED, NO ACUTE DISTRESS NOTED. BREATHING EVEN AND UNLABORED, NO SOB NOTED. 
IV TO LFA IN PLACE. SUH CATHETER IN PLACE DRAINING CLEAR YELLOW URINE. BED LOCKED AND IN 
LOWEST POSITION, CALL LIGHT IN REACH. WILL CONTINUE TO MONITOR.

## 2018-01-08 NOTE — NUR
MS RN NOTE:



PATIENT COMPLAINS OF PAIN TO RIGHT HIP/LEG, DILAUDID 1MG IV GIVEN PER MD ORDER. WILL 
CONTINUE TO MONITOR.

## 2018-01-09 ENCOUNTER — HOSPITAL ENCOUNTER (INPATIENT)
Dept: HOSPITAL 12 - REHAB | Age: 53
LOS: 27 days | Discharge: HOME HEALTH | DRG: 560 | End: 2018-02-05
Attending: PHYSICAL MEDICINE & REHABILITATION | Admitting: PHYSICAL MEDICINE & REHABILITATION
Payer: COMMERCIAL

## 2018-01-09 VITALS — SYSTOLIC BLOOD PRESSURE: 114 MMHG | DIASTOLIC BLOOD PRESSURE: 61 MMHG

## 2018-01-09 VITALS — SYSTOLIC BLOOD PRESSURE: 103 MMHG | DIASTOLIC BLOOD PRESSURE: 63 MMHG

## 2018-01-09 VITALS — HEIGHT: 64 IN | BODY MASS INDEX: 22.53 KG/M2 | WEIGHT: 132 LBS

## 2018-01-09 VITALS — SYSTOLIC BLOOD PRESSURE: 115 MMHG | DIASTOLIC BLOOD PRESSURE: 75 MMHG

## 2018-01-09 DIAGNOSIS — R26.9: ICD-10-CM

## 2018-01-09 DIAGNOSIS — F32.9: ICD-10-CM

## 2018-01-09 DIAGNOSIS — Z88.0: ICD-10-CM

## 2018-01-09 DIAGNOSIS — G89.29: ICD-10-CM

## 2018-01-09 DIAGNOSIS — G82.20: ICD-10-CM

## 2018-01-09 DIAGNOSIS — M51.26: ICD-10-CM

## 2018-01-09 DIAGNOSIS — N31.9: ICD-10-CM

## 2018-01-09 DIAGNOSIS — D63.8: ICD-10-CM

## 2018-01-09 DIAGNOSIS — R16.0: ICD-10-CM

## 2018-01-09 DIAGNOSIS — Z88.8: ICD-10-CM

## 2018-01-09 DIAGNOSIS — E44.0: ICD-10-CM

## 2018-01-09 DIAGNOSIS — D64.9: ICD-10-CM
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
## 2018-01-09 DIAGNOSIS — M41.9: ICD-10-CM

## 2018-01-09 DIAGNOSIS — R35.0: ICD-10-CM

## 2018-01-09 DIAGNOSIS — M54.5: ICD-10-CM

## 2018-01-09 DIAGNOSIS — Z16.12: ICD-10-CM

## 2018-01-09 DIAGNOSIS — D18.09: ICD-10-CM

## 2018-01-09 DIAGNOSIS — F17.210: ICD-10-CM

## 2018-01-09 DIAGNOSIS — N39.0: ICD-10-CM

## 2018-01-09 DIAGNOSIS — E83.42: ICD-10-CM

## 2018-01-09 DIAGNOSIS — S24.109S: ICD-10-CM

## 2018-01-09 DIAGNOSIS — S72.141D: Primary | ICD-10-CM

## 2018-01-09 LAB
BASOPHILS # BLD AUTO: 0 /CMM (ref 0–0.2)
BASOPHILS NFR BLD AUTO: 0.2 % (ref 0–2)
BUN SERPL-MCNC: 6 MG/DL (ref 7–18)
CALCIUM SERPL-MCNC: 8.5 MG/DL (ref 8.5–10.1)
CHLORIDE SERPL-SCNC: 103 MMOL/L (ref 98–107)
CO2 SERPL-SCNC: 25 MMOL/L (ref 21–32)
CREAT SERPL-MCNC: 0.5 MG/DL (ref 0.6–1.3)
EOSINOPHIL # BLD AUTO: 0.3 /CMM (ref 0–0.7)
EOSINOPHIL NFR BLD AUTO: 4.6 % (ref 0–6)
GLUCOSE SERPL-MCNC: 103 MG/DL (ref 74–106)
HCT VFR BLD AUTO: 25 % (ref 33–45)
HGB BLD-MCNC: 8.6 G/DL (ref 11.5–14.8)
LYMPHOCYTES NFR BLD AUTO: 2.1 /CMM (ref 0.8–4.8)
LYMPHOCYTES NFR BLD AUTO: 30.4 % (ref 20–44)
MCH RBC QN AUTO: 31 PG (ref 26–33)
MCHC RBC AUTO-ENTMCNC: 35 G/DL (ref 31–36)
MCV RBC AUTO: 91 FL (ref 82–100)
MONOCYTES NFR BLD AUTO: 0.6 /CMM (ref 0.1–1.3)
MONOCYTES NFR BLD AUTO: 8.7 % (ref 2–12)
NEUTROPHILS # BLD AUTO: 3.9 /CMM (ref 1.8–8.9)
NEUTROPHILS NFR BLD AUTO: 56.1 % (ref 43–81)
PLATELET # BLD AUTO: 182 /CMM (ref 150–450)
POTASSIUM SERPL-SCNC: 3.3 MMOL/L (ref 3.5–5.1)
RBC # BLD AUTO: 2.75 MIL/UL (ref 4–5.2)
RDW COEFFICIENT OF VARIATION: 13.8 (ref 11.5–15)
SODIUM SERPL-SCNC: 137 MMOL/L (ref 136–145)
WBC NRBC COR # BLD AUTO: 7 K/UL (ref 4.3–11)

## 2018-01-09 PROCEDURE — A4663 DIALYSIS BLOOD PRESSURE CUFF: HCPCS

## 2018-01-09 RX ADMIN — HYDROMORPHONE HYDROCHLORIDE PRN MG: 2 INJECTION, SOLUTION INTRAMUSCULAR; INTRAVENOUS; SUBCUTANEOUS at 17:55

## 2018-01-09 RX ADMIN — Medication SCH MG: at 08:14

## 2018-01-09 RX ADMIN — THERA TABS SCH UDTAB: TAB at 08:15

## 2018-01-09 RX ADMIN — PANTOPRAZOLE SODIUM SCH MG: 40 TABLET, DELAYED RELEASE ORAL at 08:14

## 2018-01-09 RX ADMIN — MUPIROCIN SCH APPLIC: 20 OINTMENT TOPICAL at 08:24

## 2018-01-09 RX ADMIN — HYDROMORPHONE HYDROCHLORIDE PRN MG: 2 INJECTION, SOLUTION INTRAMUSCULAR; INTRAVENOUS; SUBCUTANEOUS at 10:00

## 2018-01-09 RX ADMIN — Medication PRN MG: at 16:50

## 2018-01-09 RX ADMIN — HYDROMORPHONE HYDROCHLORIDE PRN MG: 2 INJECTION, SOLUTION INTRAMUSCULAR; INTRAVENOUS; SUBCUTANEOUS at 00:48

## 2018-01-09 RX ADMIN — HYDROMORPHONE HYDROCHLORIDE PRN MG: 2 INJECTION, SOLUTION INTRAMUSCULAR; INTRAVENOUS; SUBCUTANEOUS at 12:53

## 2018-01-09 RX ADMIN — HYDROMORPHONE HYDROCHLORIDE PRN MG: 2 INJECTION, SOLUTION INTRAMUSCULAR; INTRAVENOUS; SUBCUTANEOUS at 10:42

## 2018-01-09 RX ADMIN — Medication SCH MG: at 16:13

## 2018-01-09 RX ADMIN — TIZANIDINE HYDROCHLORIDE SCH MG: 4 TABLET ORAL at 12:22

## 2018-01-09 RX ADMIN — BACLOFEN SCH MG: 10 TABLET ORAL at 08:14

## 2018-01-09 RX ADMIN — TIZANIDINE HYDROCHLORIDE SCH MG: 4 TABLET ORAL at 08:14

## 2018-01-09 RX ADMIN — RIVAROXABAN SCH MG: 10 TABLET, FILM COATED ORAL at 16:12

## 2018-01-09 RX ADMIN — SODIUM CHLORIDE SCH MLS/HR: 9 INJECTION, SOLUTION INTRAVENOUS at 14:13

## 2018-01-09 RX ADMIN — HYDROMORPHONE HYDROCHLORIDE PRN MG: 2 INJECTION, SOLUTION INTRAMUSCULAR; INTRAVENOUS; SUBCUTANEOUS at 02:48

## 2018-01-09 RX ADMIN — BACLOFEN SCH MG: 10 TABLET ORAL at 12:22

## 2018-01-09 RX ADMIN — HYDROMORPHONE HYDROCHLORIDE PRN MG: 2 INJECTION, SOLUTION INTRAMUSCULAR; INTRAVENOUS; SUBCUTANEOUS at 08:15

## 2018-01-09 RX ADMIN — BACLOFEN SCH MG: 20 TABLET ORAL at 23:45

## 2018-01-09 RX ADMIN — Medication PRN MG: at 23:45

## 2018-01-09 RX ADMIN — Medication PRN MG: at 09:58

## 2018-01-09 RX ADMIN — BACLOFEN SCH MG: 10 TABLET ORAL at 16:12

## 2018-01-09 RX ADMIN — TIZANIDINE HYDROCHLORIDE SCH MG: 4 TABLET ORAL at 16:13

## 2018-01-09 RX ADMIN — HYDROMORPHONE HYDROCHLORIDE PRN MG: 2 INJECTION, SOLUTION INTRAMUSCULAR; INTRAVENOUS; SUBCUTANEOUS at 15:25

## 2018-01-09 RX ADMIN — HYDROMORPHONE HYDROCHLORIDE PRN MG: 2 INJECTION, SOLUTION INTRAMUSCULAR; INTRAVENOUS; SUBCUTANEOUS at 04:56

## 2018-01-09 RX ADMIN — NICOTINE SCH MG: 21 PATCH TRANSDERMAL at 08:14

## 2018-01-09 NOTE — NUR
RN MS INITIAL NOTES



RECEIVED PT LAYING IN BED WITH HOB ELEVATED. S/P RIGHT HIP FX, C/O PAIN 8/10. A/O X4, 
RESPIRATIONS ARE EVEN AND UNLABORED, NOT IN ANY ACUTE DISTRESS NOTED. IV TO LAF IS NOT 
PATENT, WILL INSERT A NEW PERIPHERAL IV. REMINDED PT TO USE CALL LIGHT WHEN ASSISTANCE IS 
NEEDED, CALL LIGHT LEFT WITHIN REACH.

## 2018-01-09 NOTE — NUR
Pt arrived on unit via ambulance, on gurney accompanied by 2 EMT's. IRMA x 4,  at 
bedside. Verbally responsive and able to make needs known. No acute distress noted. Admitted 
for R hip fracture with tala placement on 1/6/2018 s/p fall. All safety measures and fall 
precautions maintained. Call light and all personal belongings within reach. C/O right hip 
pain 5/10 on pain scale. Dr. Marrufo and Dr. Atkins made aware of pt arrival and Dr. Atkins made aware of med reconciliation. V/S upon admission 115/75, HR 99, 98.3 F, RR 
20, 96% RA.

-------------------------------------------------------------------------------

Addendum: 01/10/18 at 0046 by Guicho Payne RN

-------------------------------------------------------------------------------

Continuing contact isolation for MRSA nares from [facility].

## 2018-01-09 NOTE — NUR
MS RN NOTE:



PATIENT RESTING IN BED, NO ACUTE DISTRESS NOTED. BREATHING EVEN AND UNLABORED, NO SOB NOTED. 
IV TO LFA IN PLACE. SUH CATHETER IN PLACE DRAINING CLEAR YELLOW URINE. BED LOCKED AND IN 
LOWEST POSITION, CALL LIGHT IN REACH. WILL ENDORSE TO DAY NURSE TO CONTINUE WITH PLAN OF 
CARE.

## 2018-01-09 NOTE — NUR
MS RN NOTE:



PATIENT MOVED TO ROOM 201, BELONGINGS MOVED WITH PATIENT. BED LOCKED AND CALL LIGHT IN 
REACH. WILL CONTINUE TO MONITOR.

## 2018-01-09 NOTE — NUR
RN DISCHARGE NOTES



PATIENT DISCHARGED TO [facility] AT 1850 VIA AMBULANCE ACCOMANIED BY EMT PERSONNEL 
AND  IN MEDICALLY STABLE CONDITION. A/O X4, RESPIRATIONS ARE EVEN AND UNLABORED, NOT 
IN ANY ACUTE DISTRESS NOTED. VITAL SIGNS REMAINS WNL. ADMINISTERED ALL DUE MEDICATIONS, 
INCLUDING DILAUDID 1MG VIA IV FOR PAIN LEVEL 6/10 PRIOR TO LEAVING. DISCHARGE PAPERWORKS 
EXPLAINED TO PT AND  WITH VERBALIZED UNDERSTANDING. ALL BELONGINGS SENT WITH PT. 
CALLED [facility], SPOKE WITH THAO TO GIVE REPORT AT 1700. BEDSIDE ENDORSEMENT 
GIVEN TO EMT PERSONNEL.

## 2018-01-10 VITALS — DIASTOLIC BLOOD PRESSURE: 68 MMHG | SYSTOLIC BLOOD PRESSURE: 127 MMHG

## 2018-01-10 VITALS — DIASTOLIC BLOOD PRESSURE: 92 MMHG | SYSTOLIC BLOOD PRESSURE: 135 MMHG

## 2018-01-10 RX ADMIN — DOCUSATE SODIUM PRN MG: 100 CAPSULE, LIQUID FILLED ORAL at 09:39

## 2018-01-10 RX ADMIN — THERA TABS SCH UDTAB: TAB at 09:33

## 2018-01-10 RX ADMIN — HYDROMORPHONE HYDROCHLORIDE PRN MG: 2 TABLET ORAL at 21:35

## 2018-01-10 RX ADMIN — BACLOFEN SCH MG: 20 TABLET ORAL at 17:13

## 2018-01-10 RX ADMIN — RIVAROXABAN SCH MG: 10 TABLET, FILM COATED ORAL at 18:34

## 2018-01-10 RX ADMIN — Medication SCH MG: at 20:49

## 2018-01-10 RX ADMIN — HYDROMORPHONE HYDROCHLORIDE PRN MG: 2 TABLET ORAL at 08:47

## 2018-01-10 RX ADMIN — Medication SCH MG: at 13:06

## 2018-01-10 RX ADMIN — Medication SCH MG: at 08:46

## 2018-01-10 RX ADMIN — ZINC SULFATE CAP 220 MG (50 MG ELEMENTAL ZN) SCH MG: 220 (50 ZN) CAP at 09:34

## 2018-01-10 RX ADMIN — Medication SCH MG: at 17:13

## 2018-01-10 RX ADMIN — PANTOPRAZOLE SODIUM SCH MG: 40 TABLET, DELAYED RELEASE ORAL at 07:11

## 2018-01-10 RX ADMIN — SENNOSIDES PRN TAB: 8.6 TABLET, COATED ORAL at 09:36

## 2018-01-10 RX ADMIN — BACLOFEN SCH MG: 20 TABLET ORAL at 13:06

## 2018-01-10 RX ADMIN — Medication SCH MG: at 09:35

## 2018-01-10 RX ADMIN — HYDROMORPHONE HYDROCHLORIDE PRN MG: 2 TABLET ORAL at 13:06

## 2018-01-10 RX ADMIN — NICOTINE SCH MG: 21 PATCH, EXTENDED RELEASE TOPICAL at 09:40

## 2018-01-10 RX ADMIN — Medication PRN MG: at 23:15

## 2018-01-10 RX ADMIN — BACLOFEN SCH MG: 20 TABLET ORAL at 09:35

## 2018-01-10 RX ADMIN — Medication PRN MG: at 06:02

## 2018-01-10 RX ADMIN — BACLOFEN SCH MG: 20 TABLET ORAL at 20:49

## 2018-01-10 RX ADMIN — HYDROMORPHONE HYDROCHLORIDE PRN MG: 2 TABLET ORAL at 17:14

## 2018-01-10 RX ADMIN — Medication SCH MG: at 09:34

## 2018-01-10 NOTE — NUR
Received patient resting comfortably in bed and watching TV. AAO x4. Family at bedside. Able 
to make needs known. No acute distress noted. No c/o pain or discomfort at this time. Safety 
measures maintained. Call light and personal belongings within reach. Will continue to 
monitor.

## 2018-01-10 NOTE — NUR
WOUND CARE CONSULT: PT PRESENTS WITH SURGICAL INCISIONS WHICH ARE CLOSED TO RT HIP AND 
THIGH. PT ALSO NOTED TO HAVE SURGICAL SCAR TO BACK AND LARGE SCAR TO SACRUM WITH FRAGILE 
BONY AREA, PRESENT ON ADMISSION. ALL SKIN PROTECTION MEASURES IN PLACE AND DISCUSSED WITH 
NURSING STAFF. CONCUR WITH CURRENT PROTECTION OF SURGICAL INCISIONS. RECOMMEND FIRST STEP 
MATTRESS. WILL SEE PRN. MD IN AGREEMENT WITH PLAN OF CARE. 

-------------------------------------------------------------------------------

Addendum: 01/10/18 at 1217 by SID DRUMMOND RN

-------------------------------------------------------------------------------

PT IS PARAPLEGIC.

## 2018-01-10 NOTE — NUR
Patient was stable all throughout the day shift, she received new orders for pain medicine 
noted and carried out, was seen and examined by wound nurse with new orders noted and 
carried out. Patient has a valencia catheter, draining well with no sediments noted, patient 
had no signs and no symptom of discomfort at this time, pain management priovided all 
throughout the shift as needed per MDs order, needs attended promptly, due medications 
given, call light in easy reach.

## 2018-01-11 VITALS — SYSTOLIC BLOOD PRESSURE: 117 MMHG | DIASTOLIC BLOOD PRESSURE: 81 MMHG

## 2018-01-11 VITALS — SYSTOLIC BLOOD PRESSURE: 111 MMHG | DIASTOLIC BLOOD PRESSURE: 67 MMHG

## 2018-01-11 RX ADMIN — NICOTINE SCH MG: 21 PATCH, EXTENDED RELEASE TOPICAL at 08:39

## 2018-01-11 RX ADMIN — HYDROMORPHONE HYDROCHLORIDE PRN MG: 2 TABLET ORAL at 03:59

## 2018-01-11 RX ADMIN — HYDROMORPHONE HYDROCHLORIDE PRN MG: 2 TABLET ORAL at 08:54

## 2018-01-11 RX ADMIN — Medication SCH MG: at 13:02

## 2018-01-11 RX ADMIN — HYDROMORPHONE HYDROCHLORIDE PRN MG: 2 TABLET ORAL at 21:43

## 2018-01-11 RX ADMIN — HYDROMORPHONE HYDROCHLORIDE PRN MG: 2 TABLET ORAL at 17:18

## 2018-01-11 RX ADMIN — Medication SCH MG: at 08:38

## 2018-01-11 RX ADMIN — Medication SCH MG: at 21:35

## 2018-01-11 RX ADMIN — BACLOFEN SCH MG: 20 TABLET ORAL at 13:02

## 2018-01-11 RX ADMIN — BACLOFEN SCH MG: 20 TABLET ORAL at 17:16

## 2018-01-11 RX ADMIN — Medication SCH MG: at 17:16

## 2018-01-11 RX ADMIN — HYDROMORPHONE HYDROCHLORIDE PRN MG: 2 TABLET ORAL at 13:03

## 2018-01-11 RX ADMIN — BACLOFEN SCH MG: 20 TABLET ORAL at 08:38

## 2018-01-11 RX ADMIN — ZINC SULFATE CAP 220 MG (50 MG ELEMENTAL ZN) SCH MG: 220 (50 ZN) CAP at 08:43

## 2018-01-11 RX ADMIN — BACLOFEN SCH MG: 20 TABLET ORAL at 21:35

## 2018-01-11 RX ADMIN — THERA TABS SCH UDTAB: TAB at 08:37

## 2018-01-11 RX ADMIN — PANTOPRAZOLE SODIUM SCH MG: 40 TABLET, DELAYED RELEASE ORAL at 06:42

## 2018-01-11 RX ADMIN — Medication SCH MG: at 08:39

## 2018-01-11 RX ADMIN — RIVAROXABAN SCH MG: 10 TABLET, FILM COATED ORAL at 17:21

## 2018-01-11 NOTE — NUR
Patient slept intermittently at night but patient stated that she slept better than other 
nights. Pinzon catheter draining well, with yellow colored urine. Meds given as prescribed. 
All needs attended to promptly. Will endorse to day shift RN. Continue to monitor.

## 2018-01-11 NOTE — NUR
Micro results confirmed no active MRSA in nares at this time. MD notified. No longer a need 
for contact isolation precautions. Will endorse to on coming shift.

## 2018-01-11 NOTE — NUR
Pt sitting up comfortably in semi-fowlers position on specialty mattress, anticipating 
arrival of her . Pt reports severe pain and Dilaudid administered per PRN orders 
along with routine medications as scheduled. Surgical site dressing changed and mepilex 
applied due to soiling. Call light and personal items placed within reach and Pt reminded to 
use for all needs. Comfort and safety measures met at this time. Will continue to monitor 
and endorse to night shift.

## 2018-01-12 VITALS — DIASTOLIC BLOOD PRESSURE: 70 MMHG | SYSTOLIC BLOOD PRESSURE: 116 MMHG

## 2018-01-12 VITALS — DIASTOLIC BLOOD PRESSURE: 67 MMHG | SYSTOLIC BLOOD PRESSURE: 111 MMHG

## 2018-01-12 LAB
ALP SERPL-CCNC: 160 U/L (ref 50–136)
ALT SERPL W/O P-5'-P-CCNC: 22 U/L (ref 14–59)
AST SERPL-CCNC: 21 U/L (ref 15–37)
BASOPHILS # BLD AUTO: 0 K/UL (ref 0–8)
BASOPHILS NFR BLD AUTO: 0.4 % (ref 0–2)
BILIRUB SERPL-MCNC: 0.5 MG/DL (ref 0.2–1)
BUN SERPL-MCNC: 5 MG/DL (ref 7–18)
CHLORIDE SERPL-SCNC: 106 MMOL/L (ref 98–107)
CHOLEST SERPL-MCNC: 138 MG/DL (ref ?–200)
CO2 SERPL-SCNC: 26 MMOL/L (ref 21–32)
CREAT SERPL-MCNC: 0.5 MG/DL (ref 0.6–1.3)
EOSINOPHIL # BLD AUTO: 0.4 K/UL (ref 0–0.7)
EOSINOPHIL NFR BLD AUTO: 4.7 % (ref 0–7)
GLUCOSE SERPL-MCNC: 97 MG/DL (ref 74–106)
HCT VFR BLD AUTO: 28.4 % (ref 31.2–41.9)
HDLC SERPL-MCNC: 26 MG/DL (ref 40–60)
HGB BLD-MCNC: 9.8 G/DL (ref 10.9–14.3)
LYMPHOCYTES # BLD AUTO: 2 K/UL (ref 20–40)
LYMPHOCYTES NFR BLD AUTO: 25.8 % (ref 20.5–51.5)
MAGNESIUM SERPL-MCNC: 1.7 MG/DL (ref 1.8–2.4)
MCH RBC QN AUTO: 31.7 UUG (ref 24.7–32.8)
MCHC RBC AUTO-ENTMCNC: 35 G/DL (ref 32.3–35.6)
MCV RBC AUTO: 91.8 FL (ref 75.5–95.3)
MONOCYTES # BLD AUTO: 0.7 K/UL (ref 2–10)
MONOCYTES NFR BLD AUTO: 8.5 % (ref 0–11)
NEUTROPHILS # BLD AUTO: 4.8 K/UL (ref 1.8–8.9)
NEUTROPHILS NFR BLD AUTO: 60.6 % (ref 38.5–71.5)
PHOSPHATE SERPL-MCNC: 4.5 MG/DL (ref 2.5–4.9)
PLATELET # BLD AUTO: 262 K/UL (ref 179–408)
POTASSIUM SERPL-SCNC: 3.6 MMOL/L (ref 3.5–5.1)
RBC # BLD AUTO: 3.1 MIL/UL (ref 3.63–4.92)
TRIGL SERPL-MCNC: 103 MG/DL (ref 30–150)
TSH SERPL DL<=0.005 MIU/L-ACNC: 1.86 MIU/ML (ref 0.36–3.74)
WBC # BLD AUTO: 7.9 K/UL (ref 3.8–11.8)
WS STN SPEC: 6.9 G/DL (ref 6.4–8.2)

## 2018-01-12 RX ADMIN — HYDROMORPHONE HYDROCHLORIDE PRN MG: 2 TABLET ORAL at 09:50

## 2018-01-12 RX ADMIN — HYDROMORPHONE HYDROCHLORIDE PRN MG: 2 TABLET ORAL at 14:47

## 2018-01-12 RX ADMIN — HYDROMORPHONE HYDROCHLORIDE PRN MG: 2 TABLET ORAL at 01:43

## 2018-01-12 RX ADMIN — Medication SCH MG: at 08:50

## 2018-01-12 RX ADMIN — BACLOFEN SCH MG: 20 TABLET ORAL at 08:50

## 2018-01-12 RX ADMIN — Medication SCH MG: at 08:48

## 2018-01-12 RX ADMIN — NICOTINE SCH MG: 21 PATCH, EXTENDED RELEASE TOPICAL at 08:51

## 2018-01-12 RX ADMIN — Medication SCH MG: at 21:33

## 2018-01-12 RX ADMIN — ZINC SULFATE CAP 220 MG (50 MG ELEMENTAL ZN) SCH MG: 220 (50 ZN) CAP at 08:50

## 2018-01-12 RX ADMIN — PANTOPRAZOLE SODIUM SCH MG: 40 TABLET, DELAYED RELEASE ORAL at 06:17

## 2018-01-12 RX ADMIN — BACLOFEN SCH MG: 20 TABLET ORAL at 18:54

## 2018-01-12 RX ADMIN — Medication SCH MG: at 13:26

## 2018-01-12 RX ADMIN — Medication SCH MG: at 20:39

## 2018-01-12 RX ADMIN — BACLOFEN SCH MG: 20 TABLET ORAL at 20:40

## 2018-01-12 RX ADMIN — HYDROMORPHONE HYDROCHLORIDE PRN MG: 2 TABLET ORAL at 18:55

## 2018-01-12 RX ADMIN — RIVAROXABAN SCH MG: 10 TABLET, FILM COATED ORAL at 18:40

## 2018-01-12 RX ADMIN — Medication SCH MG: at 06:17

## 2018-01-12 RX ADMIN — Medication SCH MG: at 13:25

## 2018-01-12 RX ADMIN — BACLOFEN SCH MG: 20 TABLET ORAL at 13:25

## 2018-01-12 RX ADMIN — Medication SCH MG: at 18:54

## 2018-01-12 RX ADMIN — Medication PRN MG: at 21:34

## 2018-01-12 RX ADMIN — THERA TABS SCH UDTAB: TAB at 08:48

## 2018-01-12 RX ADMIN — HYDROMORPHONE HYDROCHLORIDE PRN MG: 2 TABLET ORAL at 23:45

## 2018-01-12 NOTE — NUR
Patient awake, verbalizes some pain on affected site. Encouraged to call for help whenever 
necessary. Call light within reach. Will continue to monitor.

## 2018-01-12 NOTE — NUR
Patient slept intermittently at night. Pinzon catheter intact and draining well with yellow 
colored urine. Able to make needs known. C/o pain, followed up with interventions. All needs 
attended to promptly. Will endorse to day shift RN. Continue to monitor.

## 2018-01-12 NOTE — NUR
Received patient awake, verbally responsive, coherent, not in any form of acute distress. 
She denies any pain or discomfort at this time. Call light placed within reach. Reminded to 
use call light for assistance with verbalized understanding. Assisted to her needs.

## 2018-01-12 NOTE — NUR
Change of assignment. Patient sleeping comfortably in bed. No acute distress noted. No c/o 
pain or discomfort. Safety measures maintained. Call light and personal belongings within 
reach. Will continue to monitor.

## 2018-01-12 NOTE — NUR
Patient in a lot of pain. Meds given and other interventions followed. Patient does not want 
to be disturb at this time and refused checking incision site and sacral. Will follow up. 
Continue to monitor.

## 2018-01-13 VITALS — DIASTOLIC BLOOD PRESSURE: 77 MMHG | SYSTOLIC BLOOD PRESSURE: 116 MMHG

## 2018-01-13 VITALS — DIASTOLIC BLOOD PRESSURE: 60 MMHG | SYSTOLIC BLOOD PRESSURE: 104 MMHG

## 2018-01-13 RX ADMIN — Medication SCH MG: at 13:59

## 2018-01-13 RX ADMIN — Medication SCH MG: at 08:48

## 2018-01-13 RX ADMIN — Medication SCH MG: at 22:04

## 2018-01-13 RX ADMIN — Medication SCH MG: at 08:47

## 2018-01-13 RX ADMIN — RIVAROXABAN SCH MG: 10 TABLET, FILM COATED ORAL at 18:07

## 2018-01-13 RX ADMIN — SENNOSIDES PRN TAB: 8.6 TABLET, COATED ORAL at 21:01

## 2018-01-13 RX ADMIN — NICOTINE SCH MG: 21 PATCH, EXTENDED RELEASE TOPICAL at 08:48

## 2018-01-13 RX ADMIN — BACLOFEN SCH MG: 20 TABLET ORAL at 21:01

## 2018-01-13 RX ADMIN — HYDROMORPHONE HYDROCHLORIDE PRN MG: 2 TABLET ORAL at 18:39

## 2018-01-13 RX ADMIN — PANTOPRAZOLE SODIUM SCH MG: 40 TABLET, DELAYED RELEASE ORAL at 06:46

## 2018-01-13 RX ADMIN — BACLOFEN SCH MG: 20 TABLET ORAL at 13:57

## 2018-01-13 RX ADMIN — Medication SCH MG: at 21:00

## 2018-01-13 RX ADMIN — Medication PRN MG: at 21:01

## 2018-01-13 RX ADMIN — THERA TABS SCH UDTAB: TAB at 08:47

## 2018-01-13 RX ADMIN — BACLOFEN SCH MG: 20 TABLET ORAL at 18:03

## 2018-01-13 RX ADMIN — Medication SCH MG: at 06:47

## 2018-01-13 RX ADMIN — CYANOCOBALAMIN SCH MCG: 1000 INJECTION, SOLUTION INTRAMUSCULAR at 08:48

## 2018-01-13 RX ADMIN — BACLOFEN SCH MG: 20 TABLET ORAL at 08:47

## 2018-01-13 RX ADMIN — Medication SCH MG: at 13:57

## 2018-01-13 RX ADMIN — ZINC SULFATE CAP 220 MG (50 MG ELEMENTAL ZN) SCH MG: 220 (50 ZN) CAP at 08:48

## 2018-01-13 RX ADMIN — Medication SCH MG: at 18:03

## 2018-01-13 RX ADMIN — HYDROMORPHONE HYDROCHLORIDE PRN MG: 2 TABLET ORAL at 08:51

## 2018-01-13 NOTE — NUR
Received patient sleeping, no s/s of distress. Respirations even and unlabored. Call light 
within reach. Will continue to monitor.

## 2018-01-13 NOTE — NUR
Patient sleeping with no s/s of distress. Reported tolerable pain but verbalized some relief 
after interventions. Manifested a pleasant disposition. Awake most of the shift. Call light 
kept within reach. Kept comfortable. Due meds given. Needs attended. Frequent checks done to 
ensure safety. Endorsed accordingly.

## 2018-01-13 NOTE — NUR
Received patient asleep, easily arousable, not in any form of acute distress. No noted signs 
of pain or any discomfort at this time. Call light placed within reach.

## 2018-01-14 VITALS — DIASTOLIC BLOOD PRESSURE: 63 MMHG | SYSTOLIC BLOOD PRESSURE: 122 MMHG

## 2018-01-14 VITALS — DIASTOLIC BLOOD PRESSURE: 77 MMHG | SYSTOLIC BLOOD PRESSURE: 122 MMHG

## 2018-01-14 RX ADMIN — CYANOCOBALAMIN SCH MCG: 1000 INJECTION, SOLUTION INTRAMUSCULAR at 08:28

## 2018-01-14 RX ADMIN — RIVAROXABAN SCH MG: 10 TABLET, FILM COATED ORAL at 17:10

## 2018-01-14 RX ADMIN — Medication SCH MG: at 21:05

## 2018-01-14 RX ADMIN — BACLOFEN SCH MG: 20 TABLET ORAL at 12:36

## 2018-01-14 RX ADMIN — THERA TABS SCH UDTAB: TAB at 08:21

## 2018-01-14 RX ADMIN — BACLOFEN SCH MG: 20 TABLET ORAL at 21:05

## 2018-01-14 RX ADMIN — BACLOFEN SCH MG: 20 TABLET ORAL at 17:12

## 2018-01-14 RX ADMIN — Medication SCH MG: at 12:35

## 2018-01-14 RX ADMIN — ZINC SULFATE CAP 220 MG (50 MG ELEMENTAL ZN) SCH MG: 220 (50 ZN) CAP at 08:20

## 2018-01-14 RX ADMIN — NICOTINE SCH MG: 21 PATCH, EXTENDED RELEASE TOPICAL at 08:22

## 2018-01-14 RX ADMIN — Medication SCH MG: at 08:26

## 2018-01-14 RX ADMIN — Medication SCH MG: at 06:22

## 2018-01-14 RX ADMIN — BACLOFEN SCH MG: 20 TABLET ORAL at 08:21

## 2018-01-14 RX ADMIN — SENNOSIDES PRN TAB: 8.6 TABLET, COATED ORAL at 21:12

## 2018-01-14 RX ADMIN — PANTOPRAZOLE SODIUM SCH MG: 40 TABLET, DELAYED RELEASE ORAL at 06:21

## 2018-01-14 RX ADMIN — Medication SCH MG: at 17:12

## 2018-01-14 RX ADMIN — Medication PRN MG: at 21:12

## 2018-01-14 RX ADMIN — DOCUSATE SODIUM PRN MG: 100 CAPSULE, LIQUID FILLED ORAL at 21:12

## 2018-01-14 RX ADMIN — HYDROMORPHONE HYDROCHLORIDE PRN MG: 2 TABLET ORAL at 17:30

## 2018-01-14 RX ADMIN — Medication SCH MG: at 08:20

## 2018-01-14 RX ADMIN — Medication SCH MG: at 15:40

## 2018-01-14 RX ADMIN — HYDROMORPHONE HYDROCHLORIDE PRN MG: 2 TABLET ORAL at 08:23

## 2018-01-14 RX ADMIN — HYDROMORPHONE HYDROCHLORIDE PRN MG: 2 TABLET ORAL at 12:38

## 2018-01-14 RX ADMIN — Medication SCH MG: at 21:06

## 2018-01-14 NOTE — NUR
Patient is alert and oriented, able to make her needs known, she is not in any form of 
distress, although she was given pain intervention althrough out the shift, she verbalizes 
pain relief and was able to actively participate with her therapeutic exercises.Encouraged 
patient to increase fluid intake as tolerated to prevent constipation. Patient has an 
indwelling valencia catheter, patent and intact, draining well with yellow colored urine noted 
at this time, no sedimentation noted, she denies pain nor discomfort from the valencia 
catheter. Patient is on first step mattress for skin integrity protection, skin is intact 
and dry, encouraged patient to turn and reposition for pressure relief. All belongings and 
call light placed within easy reach. Patient kept clean and dry, needs attended promptly.

## 2018-01-14 NOTE — NUR
Patient asleep with no s/s of distress. Respirations even and unlabored. Verbalized some 
relief after pain intervention. Slept well. Due meds given. Needs attended. Call light kept 
within reach. Frequent checks done. Endorsed accordingly.

## 2018-01-14 NOTE — NUR
Received patient resting comfortably in bed. AAO X4. Family at bedside. No acute distress 
noted. No c/o pain or discomfort at this time. Pinzon catheter draining well. On first step 
mattress. Turning and reposition encouraged, will implement. Call light and personal 
belongings within reach. Will continue to monitor.

## 2018-01-15 VITALS — DIASTOLIC BLOOD PRESSURE: 72 MMHG | SYSTOLIC BLOOD PRESSURE: 123 MMHG

## 2018-01-15 VITALS — DIASTOLIC BLOOD PRESSURE: 71 MMHG | SYSTOLIC BLOOD PRESSURE: 114 MMHG

## 2018-01-15 RX ADMIN — Medication SCH MG: at 21:14

## 2018-01-15 RX ADMIN — BACLOFEN SCH MG: 20 TABLET ORAL at 17:22

## 2018-01-15 RX ADMIN — Medication SCH MG: at 06:49

## 2018-01-15 RX ADMIN — BACLOFEN SCH MG: 20 TABLET ORAL at 13:22

## 2018-01-15 RX ADMIN — CYANOCOBALAMIN SCH MCG: 1000 INJECTION, SOLUTION INTRAMUSCULAR at 09:01

## 2018-01-15 RX ADMIN — Medication SCH MG: at 21:15

## 2018-01-15 RX ADMIN — BACLOFEN SCH MG: 20 TABLET ORAL at 21:14

## 2018-01-15 RX ADMIN — ZINC SULFATE CAP 220 MG (50 MG ELEMENTAL ZN) SCH MG: 220 (50 ZN) CAP at 09:01

## 2018-01-15 RX ADMIN — SENNOSIDES SCH TAB: 8.6 TABLET, COATED ORAL at 21:14

## 2018-01-15 RX ADMIN — Medication PRN MG: at 21:15

## 2018-01-15 RX ADMIN — NICOTINE SCH MG: 21 PATCH, EXTENDED RELEASE TOPICAL at 09:01

## 2018-01-15 RX ADMIN — Medication SCH MG: at 17:23

## 2018-01-15 RX ADMIN — Medication SCH MG: at 13:23

## 2018-01-15 RX ADMIN — BACLOFEN SCH MG: 20 TABLET ORAL at 09:01

## 2018-01-15 RX ADMIN — HYDROMORPHONE HYDROCHLORIDE PRN MG: 2 TABLET ORAL at 04:32

## 2018-01-15 RX ADMIN — PANTOPRAZOLE SODIUM SCH MG: 40 TABLET, DELAYED RELEASE ORAL at 06:49

## 2018-01-15 RX ADMIN — RIVAROXABAN SCH MG: 10 TABLET, FILM COATED ORAL at 17:25

## 2018-01-15 RX ADMIN — Medication SCH MG: at 09:01

## 2018-01-15 RX ADMIN — THERA TABS SCH UDTAB: TAB at 09:01

## 2018-01-15 RX ADMIN — HYDROMORPHONE HYDROCHLORIDE PRN MG: 2 TABLET ORAL at 14:14

## 2018-01-15 RX ADMIN — HYDROMORPHONE HYDROCHLORIDE PRN MG: 2 TABLET ORAL at 20:21

## 2018-01-15 RX ADMIN — HYDROMORPHONE HYDROCHLORIDE PRN MG: 2 TABLET ORAL at 10:00

## 2018-01-15 NOTE — NUR
Received an order from Dr. Marrufo for Senokot 2 tabs daily QHS and dulcolax 10mg PO one 
time dose for no BM x 3 days. Order carried out. Patient made aware.

## 2018-01-15 NOTE — NUR
Patient slept intermittently at night. C/o pain on the right hip at 0430, followed up with 
intervention. Pinzon catheter draining well with yellow colored urine. At 0710, patient 
showed both arms with redness. No pain and no itchiness. No SOB. Patient stated that she is 
okay and just want to know what it is. Will endorse to day shift RN to refer to MD.

## 2018-01-15 NOTE — NUR
Received patient awake, up on bed, verbally responsive, no dyspnea nor SOB. No complain of 
pain or any discomfort. Call light placed within reach. attended to her needs.

## 2018-01-15 NOTE — NUR
Received patient from day shift nurse. shift report at bedside. patient lying in bed 
comfortably at start of shift with no acute distress. vital signs stable at start of shift. 
pertinent assessment completed. environmental safety check done at start of shift. bed in 
low position x2 side rails up. valencia cath bag off the floor & below level of bladder. call 
light placed within reach of pt. will continue to monitor pt through shift.

## 2018-01-16 VITALS — SYSTOLIC BLOOD PRESSURE: 120 MMHG | DIASTOLIC BLOOD PRESSURE: 78 MMHG

## 2018-01-16 VITALS — SYSTOLIC BLOOD PRESSURE: 109 MMHG | DIASTOLIC BLOOD PRESSURE: 65 MMHG

## 2018-01-16 RX ADMIN — CYANOCOBALAMIN SCH MCG: 1000 INJECTION, SOLUTION INTRAMUSCULAR at 09:15

## 2018-01-16 RX ADMIN — ZINC SULFATE CAP 220 MG (50 MG ELEMENTAL ZN) SCH MG: 220 (50 ZN) CAP at 09:14

## 2018-01-16 RX ADMIN — Medication SCH MG: at 09:14

## 2018-01-16 RX ADMIN — PANTOPRAZOLE SODIUM SCH MG: 40 TABLET, DELAYED RELEASE ORAL at 06:05

## 2018-01-16 RX ADMIN — Medication SCH MG: at 21:51

## 2018-01-16 RX ADMIN — Medication SCH MG: at 12:40

## 2018-01-16 RX ADMIN — HYDROMORPHONE HYDROCHLORIDE PRN MG: 2 TABLET ORAL at 20:35

## 2018-01-16 RX ADMIN — BACLOFEN SCH MG: 20 TABLET ORAL at 16:50

## 2018-01-16 RX ADMIN — BACLOFEN SCH MG: 20 TABLET ORAL at 20:35

## 2018-01-16 RX ADMIN — Medication SCH MG: at 16:50

## 2018-01-16 RX ADMIN — Medication SCH MG: at 06:05

## 2018-01-16 RX ADMIN — Medication SCH MG: at 14:20

## 2018-01-16 RX ADMIN — NICOTINE SCH MG: 21 PATCH, EXTENDED RELEASE TOPICAL at 09:16

## 2018-01-16 RX ADMIN — BACLOFEN SCH MG: 20 TABLET ORAL at 12:40

## 2018-01-16 RX ADMIN — BACLOFEN SCH MG: 20 TABLET ORAL at 09:14

## 2018-01-16 RX ADMIN — Medication SCH MG: at 20:36

## 2018-01-16 RX ADMIN — RIVAROXABAN SCH MG: 10 TABLET, FILM COATED ORAL at 17:42

## 2018-01-16 RX ADMIN — THERA TABS SCH UDTAB: TAB at 09:14

## 2018-01-16 RX ADMIN — HYDROMORPHONE HYDROCHLORIDE PRN MG: 2 TABLET ORAL at 09:16

## 2018-01-16 RX ADMIN — HYDROMORPHONE HYDROCHLORIDE PRN MG: 2 TABLET ORAL at 16:50

## 2018-01-16 RX ADMIN — Medication PRN MG: at 21:51

## 2018-01-16 RX ADMIN — HYDROMORPHONE HYDROCHLORIDE PRN MG: 2 TABLET ORAL at 02:52

## 2018-01-16 RX ADMIN — HYDROMORPHONE HYDROCHLORIDE PRN MG: 2 TABLET ORAL at 13:32

## 2018-01-16 RX ADMIN — SENNOSIDES SCH TAB: 8.6 TABLET, COATED ORAL at 20:35

## 2018-01-16 NOTE — NUR
patient had right hip pain x2 during the shift. all pain meds administered as ordered per 
md. vital signs stable through shift. all needs attended to. all meds administered as 
ordered per md. No BM since x1 dose of dulcolax given. will endorse to day shift nurse to 
continue to monitor for BM & call MD if no BM. call light within reach of pt. safety 
measures implemented. will endorse to day shift nurse.

## 2018-01-16 NOTE — NUR
patient lying in bed comfortably at start of shift. no signs of acute distress. vital signs 
stable. was endorsed by day shift nurse that pt still has not had a BM during the day. MOM 
was given during day shift. will continue to monitor for BM during the shift. pertinent 
assessment completed. valencia cath draining urine yellow colored. valencia bag hanging on the 
side of bed off the floor and below level of the bladder. call light placed within reach of 
pt. encouraged pt to use call light when in need of assistance. environmental safety check 
completed in pt room. will continue to monitor pt through shift.

## 2018-01-16 NOTE — NUR
Received patient, awake alert x4. With pain over right hip rated as 7/10. PRN Dilaudid PRN 
given. Intact Pinzon Catheter draining to yellow colored urine. Call light within reach.

## 2018-01-17 VITALS — DIASTOLIC BLOOD PRESSURE: 81 MMHG | SYSTOLIC BLOOD PRESSURE: 127 MMHG

## 2018-01-17 VITALS — SYSTOLIC BLOOD PRESSURE: 113 MMHG | DIASTOLIC BLOOD PRESSURE: 84 MMHG

## 2018-01-17 RX ADMIN — HYDROMORPHONE HYDROCHLORIDE PRN MG: 2 TABLET ORAL at 13:00

## 2018-01-17 RX ADMIN — BACLOFEN SCH MG: 20 TABLET ORAL at 13:00

## 2018-01-17 RX ADMIN — PANTOPRAZOLE SODIUM SCH MG: 40 TABLET, DELAYED RELEASE ORAL at 06:11

## 2018-01-17 RX ADMIN — BACLOFEN SCH MG: 20 TABLET ORAL at 17:40

## 2018-01-17 RX ADMIN — ZINC SULFATE CAP 220 MG (50 MG ELEMENTAL ZN) SCH MG: 220 (50 ZN) CAP at 08:36

## 2018-01-17 RX ADMIN — BACLOFEN SCH MG: 20 TABLET ORAL at 20:49

## 2018-01-17 RX ADMIN — HYDROMORPHONE HYDROCHLORIDE PRN MG: 2 TABLET ORAL at 02:16

## 2018-01-17 RX ADMIN — Medication SCH MG: at 20:49

## 2018-01-17 RX ADMIN — CYANOCOBALAMIN SCH MCG: 1000 INJECTION, SOLUTION INTRAMUSCULAR at 08:37

## 2018-01-17 RX ADMIN — Medication SCH MG: at 06:11

## 2018-01-17 RX ADMIN — Medication SCH MG: at 13:00

## 2018-01-17 RX ADMIN — THERA TABS SCH UDTAB: TAB at 08:37

## 2018-01-17 RX ADMIN — NICOTINE SCH MG: 21 PATCH, EXTENDED RELEASE TOPICAL at 08:37

## 2018-01-17 RX ADMIN — Medication SCH MG: at 21:38

## 2018-01-17 RX ADMIN — Medication SCH MG: at 17:40

## 2018-01-17 RX ADMIN — Medication SCH MG: at 08:36

## 2018-01-17 RX ADMIN — SENNOSIDES SCH TAB: 8.6 TABLET, COATED ORAL at 20:49

## 2018-01-17 RX ADMIN — Medication SCH MG: at 14:42

## 2018-01-17 RX ADMIN — RIVAROXABAN SCH MG: 10 TABLET, FILM COATED ORAL at 17:38

## 2018-01-17 RX ADMIN — Medication PRN MG: at 21:38

## 2018-01-17 RX ADMIN — BACLOFEN SCH MG: 20 TABLET ORAL at 08:37

## 2018-01-17 RX ADMIN — HYDROMORPHONE HYDROCHLORIDE PRN MG: 2 TABLET ORAL at 08:38

## 2018-01-17 RX ADMIN — HYDROMORPHONE HYDROCHLORIDE PRN MG: 2 TABLET ORAL at 17:40

## 2018-01-17 NOTE — NUR
Patient lying in bed comfortably at start of shift with no signs of sob or acute distress. 
Pertinent assessment completed. pt is a/o x4, vital signs stable at start of shift. Pinzon 
cath checked, urine flowing through yellow in color. Pinzon bag off the floor and below level 
of bladder. call light placed within reach of pt. will continue to monitor pt through shift.

## 2018-01-17 NOTE — NUR
Patient noted laying in bed watching tv, complains of pain 5/10, no signs of distress, 
states catheter was leaking last night but reports this occurrence of leakage has stopped 
this morning, call light in reach, bed locked and in lowest position, all needs met at this 
time

## 2018-01-17 NOTE — NUR
Patient stable through shift. no acute distress noted. complaints of right hip pain during 
the shift. pain meds administered as ordered per md. vital signs stable through shift. pt 
still noted with constipation. no bm during the shift. pt feels that valencia cath is leaking 
since diaper was soaked with urine. checked valencia bag for urine draining & assessed valencia 
tubing for any signs of leakage. valencia intact no leakage noted. diaper changed and pt 
provided with routine skin care. all needs attended to. all medication administered as 
ordered per md. safety measures implemented. call light within reach of pt. will endorse to 
day shift nurse.

## 2018-01-18 VITALS — DIASTOLIC BLOOD PRESSURE: 68 MMHG | SYSTOLIC BLOOD PRESSURE: 123 MMHG

## 2018-01-18 VITALS — DIASTOLIC BLOOD PRESSURE: 77 MMHG | SYSTOLIC BLOOD PRESSURE: 130 MMHG

## 2018-01-18 RX ADMIN — ZINC SULFATE CAP 220 MG (50 MG ELEMENTAL ZN) SCH MG: 220 (50 ZN) CAP at 08:51

## 2018-01-18 RX ADMIN — Medication SCH MG: at 12:56

## 2018-01-18 RX ADMIN — Medication SCH MG: at 14:34

## 2018-01-18 RX ADMIN — PANTOPRAZOLE SODIUM SCH MG: 40 TABLET, DELAYED RELEASE ORAL at 06:12

## 2018-01-18 RX ADMIN — Medication SCH MG: at 08:51

## 2018-01-18 RX ADMIN — Medication SCH MG: at 06:12

## 2018-01-18 RX ADMIN — Medication PRN MG: at 22:57

## 2018-01-18 RX ADMIN — HYDROMORPHONE HYDROCHLORIDE PRN MG: 2 TABLET ORAL at 17:02

## 2018-01-18 RX ADMIN — DOCUSATE SODIUM PRN MG: 100 CAPSULE, LIQUID FILLED ORAL at 21:33

## 2018-01-18 RX ADMIN — HYDROMORPHONE HYDROCHLORIDE PRN MG: 2 TABLET ORAL at 01:40

## 2018-01-18 RX ADMIN — HYDROMORPHONE HYDROCHLORIDE PRN MG: 2 TABLET ORAL at 08:54

## 2018-01-18 RX ADMIN — NICOTINE SCH MG: 21 PATCH, EXTENDED RELEASE TOPICAL at 08:51

## 2018-01-18 RX ADMIN — SENNOSIDES SCH TAB: 8.6 TABLET, COATED ORAL at 21:31

## 2018-01-18 RX ADMIN — RIVAROXABAN SCH MG: 10 TABLET, FILM COATED ORAL at 17:00

## 2018-01-18 RX ADMIN — BACLOFEN SCH MG: 20 TABLET ORAL at 08:52

## 2018-01-18 RX ADMIN — BACLOFEN SCH MG: 20 TABLET ORAL at 17:01

## 2018-01-18 RX ADMIN — BACLOFEN SCH MG: 20 TABLET ORAL at 12:56

## 2018-01-18 RX ADMIN — HYDROMORPHONE HYDROCHLORIDE PRN MG: 2 TABLET ORAL at 21:33

## 2018-01-18 RX ADMIN — BACLOFEN SCH MG: 20 TABLET ORAL at 21:33

## 2018-01-18 RX ADMIN — Medication SCH MG: at 21:33

## 2018-01-18 RX ADMIN — THERA TABS SCH UDTAB: TAB at 08:52

## 2018-01-18 RX ADMIN — CYANOCOBALAMIN SCH MCG: 1000 INJECTION, SOLUTION INTRAMUSCULAR at 08:52

## 2018-01-18 RX ADMIN — Medication SCH MG: at 17:01

## 2018-01-18 RX ADMIN — Medication SCH MG: at 22:57

## 2018-01-18 RX ADMIN — HYDROMORPHONE HYDROCHLORIDE PRN MG: 2 TABLET ORAL at 12:56

## 2018-01-18 NOTE — NUR
Patient slept intermittently through shift. complained of right hip pain. administered pain 
meds as ordered per md. pt able to make needs known. all meds administered as ordered per 
md. vital signs stable through shift. safety measures implemented. call light within reach 
of pt. will endorse to day shift nurse.

## 2018-01-18 NOTE — NUR
Received pt in bed, AAO x 4. Verbally responsive and able to make needs known. Denies pain 
or discomfort at this time. No acute distress noted. All safety measures and fall 
precautions maintained. Call light and all personal belongings within reach. Will continue 
to monitor.

## 2018-01-19 VITALS — DIASTOLIC BLOOD PRESSURE: 87 MMHG | SYSTOLIC BLOOD PRESSURE: 129 MMHG

## 2018-01-19 VITALS — SYSTOLIC BLOOD PRESSURE: 122 MMHG | DIASTOLIC BLOOD PRESSURE: 82 MMHG

## 2018-01-19 RX ADMIN — RIVAROXABAN SCH MG: 10 TABLET, FILM COATED ORAL at 17:19

## 2018-01-19 RX ADMIN — BACLOFEN SCH MG: 20 TABLET ORAL at 20:06

## 2018-01-19 RX ADMIN — THERA TABS SCH UDTAB: TAB at 08:43

## 2018-01-19 RX ADMIN — BACLOFEN SCH MG: 20 TABLET ORAL at 17:13

## 2018-01-19 RX ADMIN — HYDROMORPHONE HYDROCHLORIDE PRN MG: 2 TABLET ORAL at 02:34

## 2018-01-19 RX ADMIN — ZINC SULFATE CAP 220 MG (50 MG ELEMENTAL ZN) SCH MG: 220 (50 ZN) CAP at 08:43

## 2018-01-19 RX ADMIN — HYDROMORPHONE HYDROCHLORIDE PRN MG: 2 TABLET ORAL at 09:28

## 2018-01-19 RX ADMIN — Medication SCH MG: at 06:57

## 2018-01-19 RX ADMIN — CYANOCOBALAMIN SCH MCG: 1000 INJECTION, SOLUTION INTRAMUSCULAR at 08:43

## 2018-01-19 RX ADMIN — Medication PRN MG: at 21:32

## 2018-01-19 RX ADMIN — BACLOFEN SCH MG: 20 TABLET ORAL at 13:39

## 2018-01-19 RX ADMIN — Medication SCH MG: at 20:06

## 2018-01-19 RX ADMIN — Medication SCH MG: at 13:39

## 2018-01-19 RX ADMIN — HYDROMORPHONE HYDROCHLORIDE PRN MG: 2 TABLET ORAL at 20:06

## 2018-01-19 RX ADMIN — Medication SCH MG: at 21:31

## 2018-01-19 RX ADMIN — Medication SCH MG: at 08:44

## 2018-01-19 RX ADMIN — NICOTINE SCH MG: 21 PATCH, EXTENDED RELEASE TOPICAL at 08:44

## 2018-01-19 RX ADMIN — DOCUSATE SODIUM PRN MG: 100 CAPSULE, LIQUID FILLED ORAL at 20:15

## 2018-01-19 RX ADMIN — BACLOFEN SCH MG: 20 TABLET ORAL at 08:44

## 2018-01-19 RX ADMIN — HYDROMORPHONE HYDROCHLORIDE PRN MG: 2 TABLET ORAL at 13:40

## 2018-01-19 RX ADMIN — PANTOPRAZOLE SODIUM SCH MG: 40 TABLET, DELAYED RELEASE ORAL at 06:57

## 2018-01-19 RX ADMIN — Medication SCH MG: at 17:13

## 2018-01-19 RX ADMIN — Medication SCH MG: at 14:30

## 2018-01-19 RX ADMIN — SENNOSIDES SCH TAB: 8.6 TABLET, COATED ORAL at 20:06

## 2018-01-19 NOTE — NUR
: Biopsychosocial Assessment





Bio: SW met with patient at beside to assess for needs and provide support. Patient is a 
52-year-old female admitted to ARU due to functional decline and impaired mobility after hip 
surgery. Patient has a history of thoracic spinal cord injury with paraplegia and neurogenic 
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 Patient stated that she has been 
paralyzed since last February, but started getting movement and had been getting support to 
walk again. However, she fell and broke her hip.



Mental Status: Patient appeared alert and oriented x4 during interview. She presented in a 
motivated mood, but reported that she felt tired from physical therapy. Patient has been in 
the hospital since January 9, 2018. Patient is pleasant, calm, and cooperative. Per 
patient's chart, she has a history of depression. Patient appears to be coping well with her 
hospitalization and therapies. 



Social: Patient lives at home with her . Patient reports that her  is very 
supportive. He built her hand rails to help her when she returns home. Patient reported that 
she has "all the equipment at home". 



Goals: Pt reported that she wants support in getting in home health care and information on 
possible mobile physician assistants that can come to her home. 



Interventions: SW engaged in active listening. SW provided emotional support and counseling. 
SW will connect patient to case management to discuss discharge needs, such as home health. 
SW will encourage pt to comply with rehab goals.

## 2018-01-19 NOTE — NUR
Received patient in bed, alert/oriented x 4. Verbally responsive and able to make needs 
known. Denies pain or discomfort at this time. No acute distress, no respiratory distress 
noted. All safety measures and fall precautions maintained. Call light and all personal 
belongings within reach. Will continue to monitor.

## 2018-01-19 NOTE — NUR
SBAR report received from Vish GRAHAM of day shift. Pt Board updated. Pt currently working with 
PT in the gym, reports pain to be managed at a tolerable level at this time. All safety 
needs met at this time. Will continue to monitor.

## 2018-01-19 NOTE — NUR
Pt ate 100% of dinner, with  visiting at bedside currently. All safety and comfort 
measures met at this time. Bed in locked and lowest position, with side rails up. Pt denies 
any pain or discomfort, since managed through pain medication. Pt had a large BMx1. Call 
light and personal items placed within reach. Will continue to monitor and endorse to on 
coming night shift.

## 2018-01-20 VITALS — SYSTOLIC BLOOD PRESSURE: 126 MMHG | DIASTOLIC BLOOD PRESSURE: 68 MMHG

## 2018-01-20 VITALS — SYSTOLIC BLOOD PRESSURE: 115 MMHG | DIASTOLIC BLOOD PRESSURE: 66 MMHG

## 2018-01-20 RX ADMIN — NICOTINE SCH MG: 21 PATCH, EXTENDED RELEASE TOPICAL at 09:14

## 2018-01-20 RX ADMIN — Medication SCH MG: at 18:13

## 2018-01-20 RX ADMIN — ZINC SULFATE CAP 220 MG (50 MG ELEMENTAL ZN) SCH MG: 220 (50 ZN) CAP at 09:14

## 2018-01-20 RX ADMIN — Medication SCH MG: at 09:14

## 2018-01-20 RX ADMIN — BACLOFEN SCH MG: 20 TABLET ORAL at 13:36

## 2018-01-20 RX ADMIN — THERA TABS SCH UDTAB: TAB at 09:14

## 2018-01-20 RX ADMIN — PANTOPRAZOLE SODIUM SCH MG: 40 TABLET, DELAYED RELEASE ORAL at 06:40

## 2018-01-20 RX ADMIN — HYDROMORPHONE HYDROCHLORIDE PRN MG: 2 TABLET ORAL at 06:41

## 2018-01-20 RX ADMIN — Medication SCH MG: at 05:10

## 2018-01-20 RX ADMIN — BACLOFEN SCH MG: 20 TABLET ORAL at 18:13

## 2018-01-20 RX ADMIN — Medication SCH MG: at 13:37

## 2018-01-20 RX ADMIN — Medication SCH MG: at 13:36

## 2018-01-20 RX ADMIN — CYANOCOBALAMIN SCH MCG: 1000 INJECTION, SOLUTION INTRAMUSCULAR at 09:14

## 2018-01-20 RX ADMIN — BACLOFEN SCH MG: 20 TABLET ORAL at 21:09

## 2018-01-20 RX ADMIN — HYDROMORPHONE HYDROCHLORIDE PRN MG: 2 TABLET ORAL at 19:05

## 2018-01-20 RX ADMIN — SENNOSIDES SCH TAB: 8.6 TABLET, COATED ORAL at 21:09

## 2018-01-20 RX ADMIN — RIVAROXABAN SCH MG: 10 TABLET, FILM COATED ORAL at 18:22

## 2018-01-20 RX ADMIN — Medication SCH MG: at 21:10

## 2018-01-20 RX ADMIN — HYDROMORPHONE HYDROCHLORIDE PRN MG: 2 TABLET ORAL at 01:18

## 2018-01-20 RX ADMIN — HYDROMORPHONE HYDROCHLORIDE PRN MG: 2 TABLET ORAL at 11:21

## 2018-01-20 RX ADMIN — BACLOFEN SCH MG: 20 TABLET ORAL at 09:14

## 2018-01-20 RX ADMIN — Medication SCH MG: at 21:11

## 2018-01-20 RX ADMIN — Medication PRN MG: at 21:10

## 2018-01-20 NOTE — NUR
Pt resting comfortably in bed and watching TV. AAO x4. Pinzon catheter intact and draining 
well. Room air. No acute distress noted. Mild pain on the right hip. Will follow up with 
intervention. Safety measures maintained. Call light and personal belongings within reach. 
Will continue to monitor.

## 2018-01-20 NOTE — NUR
Patient slept intermittently at night. C/o pain, followed with interventions. No acute 
distress noted. Meds given and pt has been compliant. Surgical site, clean and dry. No s/s 
of infection. Safety measures maintained. Call light and personal belongings within reach. 
Will endorse to day shift RN. Continue to monitor.

## 2018-01-20 NOTE — NUR
Received patient asleep, easily arousable, not in any form of acute distress. No noted signs 
or symptoms of pain or discomfort. Call light placed within reach.

## 2018-01-21 VITALS — SYSTOLIC BLOOD PRESSURE: 107 MMHG | DIASTOLIC BLOOD PRESSURE: 60 MMHG

## 2018-01-21 VITALS — DIASTOLIC BLOOD PRESSURE: 60 MMHG | SYSTOLIC BLOOD PRESSURE: 107 MMHG

## 2018-01-21 VITALS — DIASTOLIC BLOOD PRESSURE: 66 MMHG | SYSTOLIC BLOOD PRESSURE: 114 MMHG

## 2018-01-21 RX ADMIN — Medication SCH MG: at 22:00

## 2018-01-21 RX ADMIN — HYDROMORPHONE HYDROCHLORIDE PRN MG: 2 TABLET ORAL at 09:09

## 2018-01-21 RX ADMIN — Medication SCH MG: at 13:05

## 2018-01-21 RX ADMIN — BACLOFEN SCH MG: 20 TABLET ORAL at 20:40

## 2018-01-21 RX ADMIN — Medication SCH MG: at 09:07

## 2018-01-21 RX ADMIN — PANTOPRAZOLE SODIUM SCH MG: 40 TABLET, DELAYED RELEASE ORAL at 08:06

## 2018-01-21 RX ADMIN — ZINC SULFATE CAP 220 MG (50 MG ELEMENTAL ZN) SCH MG: 220 (50 ZN) CAP at 09:07

## 2018-01-21 RX ADMIN — Medication SCH MG: at 15:27

## 2018-01-21 RX ADMIN — DOCUSATE SODIUM PRN MG: 100 CAPSULE, LIQUID FILLED ORAL at 18:57

## 2018-01-21 RX ADMIN — Medication PRN MG: at 22:00

## 2018-01-21 RX ADMIN — RIVAROXABAN SCH MG: 10 TABLET, FILM COATED ORAL at 17:35

## 2018-01-21 RX ADMIN — BACLOFEN SCH MG: 20 TABLET ORAL at 13:05

## 2018-01-21 RX ADMIN — HYDROMORPHONE HYDROCHLORIDE PRN MG: 2 TABLET ORAL at 18:55

## 2018-01-21 RX ADMIN — NICOTINE SCH MG: 21 PATCH, EXTENDED RELEASE TOPICAL at 09:05

## 2018-01-21 RX ADMIN — CYANOCOBALAMIN SCH MCG: 1000 INJECTION, SOLUTION INTRAMUSCULAR at 09:08

## 2018-01-21 RX ADMIN — HYDROMORPHONE HYDROCHLORIDE PRN MG: 2 TABLET ORAL at 00:28

## 2018-01-21 RX ADMIN — BACLOFEN SCH MG: 20 TABLET ORAL at 17:33

## 2018-01-21 RX ADMIN — THERA TABS SCH UDTAB: TAB at 09:07

## 2018-01-21 RX ADMIN — Medication SCH MG: at 20:41

## 2018-01-21 RX ADMIN — SENNOSIDES SCH TAB: 8.6 TABLET, COATED ORAL at 20:40

## 2018-01-21 RX ADMIN — Medication SCH MG: at 17:33

## 2018-01-21 RX ADMIN — Medication SCH MG: at 05:36

## 2018-01-21 RX ADMIN — BACLOFEN SCH MG: 20 TABLET ORAL at 09:07

## 2018-01-21 RX ADMIN — HYDROMORPHONE HYDROCHLORIDE PRN MG: 2 TABLET ORAL at 13:05

## 2018-01-21 NOTE — NUR
Patient slept intermittently at night. Meds given per MD's order. Pt has been compliant. 
Vital signs stable. All needs attended to promptly. Will endorse to day shift RN. Continue 
to monitor.

## 2018-01-21 NOTE — NUR
Patient is awake anf alert, not in acute distress, denies discomfort at this time. All 
throughout the shift she was provided with pain management routinely as ordered, and 
encouraged her to increase her fluid intake , to prevent constipation. She participated with 
her physical and occupational therapy sessions earlier in the day. She is on valencia catheter 
and on a special mattress for skin management, encouraged her to turn and reposition for 
pressure relief, she remained overall compliant with her plan of care and verbalized 
understanding. Needs attended promptly, call light in reach.

## 2018-01-21 NOTE — NUR
Patient does not want to take Protonix at this time. She wants to take it before breakfast 
around 0730. Will endorse to day shift RN.

## 2018-01-21 NOTE — NUR
Received pt in bed, AAO x 4 watching television. Verbally responsive and able to make needs 
known. Denies pain or discomfort at this time. No acute distress noted. Upon assessment, 
noted that her dressing on right hip was not on. Pt stated "it fell off during physical 
therapy". Dressing change done per MD order. No drainage, redness or swelling noted. Pt 
denies pain at site. All safety measures and fall precautions maintained. Call light and all 
personal belongings within reach. Will continue to monitor.

## 2018-01-22 VITALS — SYSTOLIC BLOOD PRESSURE: 128 MMHG | DIASTOLIC BLOOD PRESSURE: 75 MMHG

## 2018-01-22 VITALS — DIASTOLIC BLOOD PRESSURE: 92 MMHG | SYSTOLIC BLOOD PRESSURE: 127 MMHG

## 2018-01-22 RX ADMIN — BACLOFEN SCH MG: 20 TABLET ORAL at 08:56

## 2018-01-22 RX ADMIN — THERA TABS SCH UDTAB: TAB at 08:55

## 2018-01-22 RX ADMIN — Medication PRN MG: at 22:11

## 2018-01-22 RX ADMIN — Medication SCH MG: at 16:50

## 2018-01-22 RX ADMIN — BACLOFEN SCH MG: 20 TABLET ORAL at 16:50

## 2018-01-22 RX ADMIN — Medication SCH MG: at 22:11

## 2018-01-22 RX ADMIN — Medication SCH MG: at 08:55

## 2018-01-22 RX ADMIN — BACLOFEN SCH MG: 20 TABLET ORAL at 13:35

## 2018-01-22 RX ADMIN — Medication SCH MG: at 13:35

## 2018-01-22 RX ADMIN — DOCUSATE SODIUM SCH MG: 100 CAPSULE, LIQUID FILLED ORAL at 21:15

## 2018-01-22 RX ADMIN — PANTOPRAZOLE SODIUM SCH MG: 40 TABLET, DELAYED RELEASE ORAL at 06:41

## 2018-01-22 RX ADMIN — RIVAROXABAN SCH MG: 10 TABLET, FILM COATED ORAL at 17:02

## 2018-01-22 RX ADMIN — HYDROMORPHONE HYDROCHLORIDE PRN MG: 2 TABLET ORAL at 01:59

## 2018-01-22 RX ADMIN — SENNOSIDES SCH TAB: 8.6 TABLET, COATED ORAL at 21:15

## 2018-01-22 RX ADMIN — Medication SCH MG: at 06:41

## 2018-01-22 RX ADMIN — DOCUSATE SODIUM SCH MG: 100 CAPSULE, LIQUID FILLED ORAL at 09:08

## 2018-01-22 RX ADMIN — Medication SCH MG: at 21:15

## 2018-01-22 RX ADMIN — HYDROMORPHONE HYDROCHLORIDE PRN MG: 2 TABLET ORAL at 08:56

## 2018-01-22 RX ADMIN — ZINC SULFATE CAP 220 MG (50 MG ELEMENTAL ZN) SCH MG: 220 (50 ZN) CAP at 08:55

## 2018-01-22 RX ADMIN — CYANOCOBALAMIN SCH MCG: 1000 INJECTION, SOLUTION INTRAMUSCULAR at 08:54

## 2018-01-22 RX ADMIN — NICOTINE SCH MG: 21 PATCH, EXTENDED RELEASE TOPICAL at 08:56

## 2018-01-22 RX ADMIN — HYDROMORPHONE HYDROCHLORIDE PRN MG: 2 TABLET ORAL at 20:03

## 2018-01-22 RX ADMIN — BACLOFEN SCH MG: 20 TABLET ORAL at 21:15

## 2018-01-22 NOTE — NUR
Received pt in bed, AAO x 4 on her computer. Verbally responsive and able to make needs 
known. No acute distress noted. Denies pain or discomfort at this time. All safety measures 
and fall precautions maintained. Call light and all personal belongings within reach. Will 
continue to monitor.

## 2018-01-23 VITALS — SYSTOLIC BLOOD PRESSURE: 128 MMHG | DIASTOLIC BLOOD PRESSURE: 83 MMHG

## 2018-01-23 VITALS — SYSTOLIC BLOOD PRESSURE: 112 MMHG | DIASTOLIC BLOOD PRESSURE: 57 MMHG

## 2018-01-23 RX ADMIN — Medication SCH MG: at 08:26

## 2018-01-23 RX ADMIN — SENNOSIDES SCH TAB: 8.6 TABLET, COATED ORAL at 21:07

## 2018-01-23 RX ADMIN — BACLOFEN SCH MG: 20 TABLET ORAL at 17:21

## 2018-01-23 RX ADMIN — DOCUSATE SODIUM SCH MG: 100 CAPSULE, LIQUID FILLED ORAL at 08:26

## 2018-01-23 RX ADMIN — CYANOCOBALAMIN SCH MCG: 1000 INJECTION, SOLUTION INTRAMUSCULAR at 08:26

## 2018-01-23 RX ADMIN — BACLOFEN SCH MG: 20 TABLET ORAL at 08:26

## 2018-01-23 RX ADMIN — Medication SCH MG: at 21:15

## 2018-01-23 RX ADMIN — NICOTINE SCH MG: 21 PATCH, EXTENDED RELEASE TOPICAL at 08:26

## 2018-01-23 RX ADMIN — Medication SCH MG: at 06:55

## 2018-01-23 RX ADMIN — RIVAROXABAN SCH MG: 10 TABLET, FILM COATED ORAL at 17:21

## 2018-01-23 RX ADMIN — BACLOFEN SCH MG: 20 TABLET ORAL at 21:07

## 2018-01-23 RX ADMIN — BACLOFEN SCH MG: 20 TABLET ORAL at 12:19

## 2018-01-23 RX ADMIN — PANTOPRAZOLE SODIUM SCH MG: 40 TABLET, DELAYED RELEASE ORAL at 06:55

## 2018-01-23 RX ADMIN — Medication SCH MG: at 14:09

## 2018-01-23 RX ADMIN — Medication SCH MG: at 21:06

## 2018-01-23 RX ADMIN — SODIUM CHLORIDE SCH MLS/HR: 9 INJECTION, SOLUTION INTRAVENOUS at 08:00

## 2018-01-23 RX ADMIN — DOCUSATE SODIUM SCH MG: 100 CAPSULE, LIQUID FILLED ORAL at 21:07

## 2018-01-23 RX ADMIN — SODIUM CHLORIDE SCH MLS/HR: 9 INJECTION, SOLUTION INTRAVENOUS at 16:02

## 2018-01-23 RX ADMIN — Medication SCH MG: at 17:21

## 2018-01-23 RX ADMIN — ZINC SULFATE CAP 220 MG (50 MG ELEMENTAL ZN) SCH MG: 220 (50 ZN) CAP at 08:26

## 2018-01-23 RX ADMIN — HYDROMORPHONE HYDROCHLORIDE PRN MG: 2 TABLET ORAL at 19:20

## 2018-01-23 RX ADMIN — THERA TABS SCH UDTAB: TAB at 08:26

## 2018-01-23 RX ADMIN — Medication SCH MG: at 12:19

## 2018-01-23 RX ADMIN — SODIUM CHLORIDE SCH MLS/HR: 9 INJECTION, SOLUTION INTRAVENOUS at 21:06

## 2018-01-23 NOTE — NUR
pt seen on rounding. pt continues to be alert and oriented. vitals stable. no new injuries 
upon report. pt will have iv inserted for iv antibiotics due to esbl on urine. pt requests 
to have it done after therapy. pt continues to complain of pain. pt given oxycodone. 
dilaudid was discontinued. will continue to monitor pt for complications.

## 2018-01-23 NOTE — NUR
PT IN ROOM ALERT AWAKE AND ORIENTED. ABLE TO FOLLOW SIMPLE COMMANDS. STATES PAIN TO RIGHT 
HIP AREA. NO ACTIVE BLEEDING OR S/S OF INFECTION NOTED TO SURGICAL SITE. DRESSING IS INTACT. 
DENIES ANY SOB OR CHEST PAIN. CONTINUE TO MONITOR. ISOLATION MAINTAINED D/T ESBL OR URINE. 
CALL LIGHT PLACED WITHIN REACH.

## 2018-01-23 NOTE — NUR
On call Dr. Duncan Astorga made aware regarding pts UACS result, ordered for contact isolation 
and meropenem 1g q8hr x 10days. However, pt is allergic to penicillin, MD made aware and 
states that it's okay to give. Order carried out. 

-------------------------------------------------------------------------------

Addendum: 01/23/18 at 0712 by VI ZALDIVAR RN

-------------------------------------------------------------------------------

Will endorse to day shift nurse.

## 2018-01-23 NOTE — NUR
pt stable throuhgout rhe day. called md to continue dilaudid. md agreed. pt given dilaudid. 
will endorse to night shift nurse.

## 2018-01-24 VITALS — SYSTOLIC BLOOD PRESSURE: 122 MMHG | DIASTOLIC BLOOD PRESSURE: 79 MMHG

## 2018-01-24 VITALS — SYSTOLIC BLOOD PRESSURE: 122 MMHG | DIASTOLIC BLOOD PRESSURE: 77 MMHG

## 2018-01-24 LAB
ALP SERPL-CCNC: 221 U/L (ref 50–136)
ALT SERPL W/O P-5'-P-CCNC: 22 U/L (ref 14–59)
AST SERPL-CCNC: 22 U/L (ref 15–37)
BASOPHILS # BLD AUTO: 0.1 K/UL (ref 0–8)
BASOPHILS NFR BLD AUTO: 1 % (ref 0–2)
BILIRUB SERPL-MCNC: 0.2 MG/DL (ref 0.2–1)
BUN SERPL-MCNC: 8 MG/DL (ref 7–18)
CHLORIDE SERPL-SCNC: 105 MMOL/L (ref 98–107)
CO2 SERPL-SCNC: 28 MMOL/L (ref 21–32)
CREAT SERPL-MCNC: 0.7 MG/DL (ref 0.6–1.3)
EOSINOPHIL # BLD AUTO: 0.3 K/UL (ref 0–0.7)
EOSINOPHIL NFR BLD AUTO: 4.6 % (ref 0–7)
GLUCOSE SERPL-MCNC: 121 MG/DL (ref 74–106)
HCT VFR BLD AUTO: 34 % (ref 31.2–41.9)
HGB BLD-MCNC: 11.3 G/DL (ref 10.9–14.3)
LYMPHOCYTES # BLD AUTO: 1.6 K/UL (ref 20–40)
LYMPHOCYTES NFR BLD AUTO: 27.5 % (ref 20.5–51.5)
MAGNESIUM SERPL-MCNC: 1.8 MG/DL (ref 1.8–2.4)
MCH RBC QN AUTO: 30.8 UUG (ref 24.7–32.8)
MCHC RBC AUTO-ENTMCNC: 33 G/DL (ref 32.3–35.6)
MCV RBC AUTO: 92.8 FL (ref 75.5–95.3)
MONOCYTES # BLD AUTO: 0.4 K/UL (ref 2–10)
MONOCYTES NFR BLD AUTO: 7.6 % (ref 0–11)
NEUTROPHILS # BLD AUTO: 3.4 K/UL (ref 1.8–8.9)
NEUTROPHILS NFR BLD AUTO: 59.3 % (ref 38.5–71.5)
PHOSPHATE SERPL-MCNC: 3.9 MG/DL (ref 2.5–4.9)
PLATELET # BLD AUTO: 210 K/UL (ref 179–408)
POTASSIUM SERPL-SCNC: 3.5 MMOL/L (ref 3.5–5.1)
RBC # BLD AUTO: 3.66 MIL/UL (ref 3.63–4.92)
WBC # BLD AUTO: 5.8 K/UL (ref 3.8–11.8)
WS STN SPEC: 7.4 G/DL (ref 6.4–8.2)

## 2018-01-24 RX ADMIN — DOCUSATE SODIUM SCH MG: 100 CAPSULE, LIQUID FILLED ORAL at 21:14

## 2018-01-24 RX ADMIN — THERA TABS SCH UDTAB: TAB at 09:51

## 2018-01-24 RX ADMIN — BACLOFEN SCH MG: 20 TABLET ORAL at 17:07

## 2018-01-24 RX ADMIN — BACLOFEN SCH MG: 20 TABLET ORAL at 13:32

## 2018-01-24 RX ADMIN — SENNOSIDES SCH TAB: 8.6 TABLET, COATED ORAL at 21:12

## 2018-01-24 RX ADMIN — Medication SCH MG: at 13:32

## 2018-01-24 RX ADMIN — SODIUM CHLORIDE SCH MLS/HR: 9 INJECTION, SOLUTION INTRAVENOUS at 21:11

## 2018-01-24 RX ADMIN — HYDROMORPHONE HYDROCHLORIDE PRN MG: 2 TABLET ORAL at 00:18

## 2018-01-24 RX ADMIN — Medication SCH MG: at 06:53

## 2018-01-24 RX ADMIN — HYDROMORPHONE HYDROCHLORIDE PRN MG: 2 TABLET ORAL at 17:11

## 2018-01-24 RX ADMIN — RIVAROXABAN SCH MG: 10 TABLET, FILM COATED ORAL at 17:10

## 2018-01-24 RX ADMIN — CYANOCOBALAMIN SCH MCG: 1000 INJECTION, SOLUTION INTRAMUSCULAR at 09:00

## 2018-01-24 RX ADMIN — LACTOBACILLUS ACIDOPH-L.BULGARICUS 1 MILLION CELL CHEWABLE TABLET SCH TAB.CHEW: at 21:13

## 2018-01-24 RX ADMIN — SODIUM CHLORIDE SCH MLS/HR: 9 INJECTION, SOLUTION INTRAVENOUS at 14:00

## 2018-01-24 RX ADMIN — PANTOPRAZOLE SODIUM SCH MG: 40 TABLET, DELAYED RELEASE ORAL at 06:52

## 2018-01-24 RX ADMIN — Medication SCH MG: at 09:51

## 2018-01-24 RX ADMIN — ZINC SULFATE CAP 220 MG (50 MG ELEMENTAL ZN) SCH MG: 220 (50 ZN) CAP at 09:51

## 2018-01-24 RX ADMIN — SODIUM CHLORIDE SCH MLS/HR: 9 INJECTION, SOLUTION INTRAVENOUS at 06:52

## 2018-01-24 RX ADMIN — DOCUSATE SODIUM SCH MG: 100 CAPSULE, LIQUID FILLED ORAL at 09:00

## 2018-01-24 RX ADMIN — NICOTINE SCH MG: 21 PATCH, EXTENDED RELEASE TOPICAL at 09:52

## 2018-01-24 RX ADMIN — Medication SCH MG: at 17:08

## 2018-01-24 RX ADMIN — BACLOFEN SCH MG: 20 TABLET ORAL at 09:51

## 2018-01-24 RX ADMIN — Medication SCH MG: at 13:33

## 2018-01-24 RX ADMIN — HYDROMORPHONE HYDROCHLORIDE PRN MG: 2 TABLET ORAL at 09:53

## 2018-01-24 RX ADMIN — Medication SCH MG: at 21:31

## 2018-01-24 RX ADMIN — HYDROMORPHONE HYDROCHLORIDE PRN MG: 2 TABLET ORAL at 21:13

## 2018-01-24 RX ADMIN — LACTOBACILLUS ACIDOPH-L.BULGARICUS 1 MILLION CELL CHEWABLE TABLET SCH TAB.CHEW: at 09:51

## 2018-01-24 RX ADMIN — BACLOFEN SCH MG: 20 TABLET ORAL at 21:13

## 2018-01-24 NOTE — NUR
PT IN ROOM ALERT AWAKE IN NO ACUTE DISTRESS. NO REACTION TO CURRENT IV ATB MERREM THERAPY. 
PAIN STILL PRESENT TO RIGHT HIP. NO BLEEDING OR S/S OF INFECTION NOTED. CONTINUE TO MONITOR.

## 2018-01-24 NOTE — NUR
PT IN ROOM ALERT AWAKE AND ORIENTED. ABLE TO FOLLOW SIMPLE COMMANDS. STATES PAIN TO RIGHT 
HIP AREA, DILAUDID 4MG PO WAS GIVEN AT [id number].  NO ACTIVE BLEEDING OR S/S OF INFECTION NOTED 
TO SURGICAL SITE. DRESSING IS INTACT. DENIES ANY SOB OR CHEST PAIN.  SEEN BY UROLOGIS, 
DISCONTINUE KULKARNI CATHETER BY MD. CONTINUE TO MONITOR. ISOLATION MAINTAINED D/T ESBL OR 
URINE. CALL LIGHT PLACED WITHIN REACH. 

-------------------------------------------------------------------------------

Addendum: 01/24/18 at 1603 by YORDAN AUGUSTE RN

-------------------------------------------------------------------------------

DILAUDID WAS GIVEN AT 0953.

## 2018-01-24 NOTE — NUR
PT RECEIVED IN BED, AWAKE. A/OX4. ABLE TO MAKE NEEDS KNOWN. V/S STABLE. IN NO ACUTE 
DISTRESS. NO C/O PAIN AT THIS TIME. MIDLINE INTACT AND PATENT, SALINE LOCKED AT THIS TIME. 
ON RA, TOLERATING WELL. AFEBRILE. ON FIRST STEP MATTRESS, OFFLOADING SACRAL/COCCYX AREA. ALL 
DRESSINGS C/D/I. HOB ELEVATED. SAFETY MEASURES IMPLEMENTED. CALL LIGHT WITHIN REACH.

## 2018-01-24 NOTE — NUR
Patient remains in stable condition. Dressing change done on right hip and coccyx area. 
Showered today.  Will continue to monitor for safety and needs.

## 2018-01-24 NOTE — NUR
PT IN ROOM ASLEEP AT THIS TIME. NO C/O PAIN TO RIGHT HIP. CALL LIGHT WITHIN REACH. CONTINUE 
TO MONITOR.

## 2018-01-25 VITALS — SYSTOLIC BLOOD PRESSURE: 136 MMHG | DIASTOLIC BLOOD PRESSURE: 81 MMHG

## 2018-01-25 VITALS — SYSTOLIC BLOOD PRESSURE: 119 MMHG | DIASTOLIC BLOOD PRESSURE: 66 MMHG

## 2018-01-25 RX ADMIN — DOCUSATE SODIUM SCH MG: 100 CAPSULE, LIQUID FILLED ORAL at 09:00

## 2018-01-25 RX ADMIN — SENNOSIDES SCH TAB: 8.6 TABLET, COATED ORAL at 20:19

## 2018-01-25 RX ADMIN — BACLOFEN SCH MG: 20 TABLET ORAL at 09:32

## 2018-01-25 RX ADMIN — SODIUM CHLORIDE SCH MLS/HR: 9 INJECTION, SOLUTION INTRAVENOUS at 20:20

## 2018-01-25 RX ADMIN — NICOTINE SCH MG: 21 PATCH, EXTENDED RELEASE TOPICAL at 09:31

## 2018-01-25 RX ADMIN — SODIUM CHLORIDE SCH MLS/HR: 9 INJECTION, SOLUTION INTRAVENOUS at 13:13

## 2018-01-25 RX ADMIN — Medication SCH MG: at 13:30

## 2018-01-25 RX ADMIN — DOCUSATE SODIUM SCH MG: 100 CAPSULE, LIQUID FILLED ORAL at 20:19

## 2018-01-25 RX ADMIN — HYDROMORPHONE HYDROCHLORIDE PRN MG: 2 TABLET ORAL at 20:38

## 2018-01-25 RX ADMIN — RIVAROXABAN SCH MG: 10 TABLET, FILM COATED ORAL at 17:43

## 2018-01-25 RX ADMIN — HYDROMORPHONE HYDROCHLORIDE PRN MG: 2 TABLET ORAL at 09:32

## 2018-01-25 RX ADMIN — SODIUM CHLORIDE SCH MLS/HR: 9 INJECTION, SOLUTION INTRAVENOUS at 05:01

## 2018-01-25 RX ADMIN — BACLOFEN SCH MG: 20 TABLET ORAL at 13:30

## 2018-01-25 RX ADMIN — SODIUM CHLORIDE SCH MLS/HR: 9 INJECTION, SOLUTION INTRAVENOUS at 20:17

## 2018-01-25 RX ADMIN — Medication SCH MG: at 20:19

## 2018-01-25 RX ADMIN — CYANOCOBALAMIN SCH MCG: 1000 INJECTION, SOLUTION INTRAMUSCULAR at 09:32

## 2018-01-25 RX ADMIN — Medication SCH MG: at 17:43

## 2018-01-25 RX ADMIN — BACLOFEN SCH MG: 20 TABLET ORAL at 20:19

## 2018-01-25 RX ADMIN — HYDROMORPHONE HYDROCHLORIDE PRN MG: 2 TABLET ORAL at 13:30

## 2018-01-25 RX ADMIN — LACTOBACILLUS ACIDOPH-L.BULGARICUS 1 MILLION CELL CHEWABLE TABLET SCH TAB.CHEW: at 20:19

## 2018-01-25 RX ADMIN — THERA TABS SCH UDTAB: TAB at 09:31

## 2018-01-25 RX ADMIN — PANTOPRAZOLE SODIUM SCH MG: 40 TABLET, DELAYED RELEASE ORAL at 06:04

## 2018-01-25 RX ADMIN — SODIUM CHLORIDE SCH MLS/HR: 9 INJECTION, SOLUTION INTRAVENOUS at 14:00

## 2018-01-25 RX ADMIN — ZINC SULFATE CAP 220 MG (50 MG ELEMENTAL ZN) SCH MG: 220 (50 ZN) CAP at 09:32

## 2018-01-25 RX ADMIN — HYDROMORPHONE HYDROCHLORIDE PRN MG: 2 TABLET ORAL at 17:45

## 2018-01-25 RX ADMIN — LACTOBACILLUS ACIDOPH-L.BULGARICUS 1 MILLION CELL CHEWABLE TABLET SCH TAB.CHEW: at 09:32

## 2018-01-25 RX ADMIN — Medication SCH MG: at 09:32

## 2018-01-25 RX ADMIN — BACLOFEN SCH MG: 20 TABLET ORAL at 17:43

## 2018-01-25 RX ADMIN — HYDROMORPHONE HYDROCHLORIDE PRN MG: 2 TABLET ORAL at 05:02

## 2018-01-25 NOTE — NUR
PATIENT'S MID-LINE NOTED BEING UNFLUSHABLE, DOCTOR JONA NOTIFIED OF FINDINGS, UNABLE TO GIVE 
MERREM AT THIS TIME, PHARMACY NOTIFIED OF NON ADMINISTRATION OF MERREM, MID-LINE NURSE 
NOTIFIED AND STATED THAT SOMEONE WOULD COME TO REPLACE MID-LINE.

## 2018-01-25 NOTE — NUR
END OF SHIFT NOTES. PT SLEPT WELL THROUGHOUT SHIFT. IN STABLE CONDITION. IV ABX INFUSED. 
PAIN MANAGED AS VERBALIZED BY PATIENTS RELIEF OF RIGHT HIP PAIN. CONTINUES TO HAVE GOOD 
URINE OUTPUT THROUGHOUT SHIFT. SACRAL/COCCYX OFFLOADING. ON FIRST STEP MATTRESS, HOB 
ELEVATED. RIGHT HIP DRESSING KEPT C/D/I. CONTACT ISOLATION MAINTAINED. ALL NEEDS ATTENDED. 
SAFETY MAINTAINED. CALL LIGHT WITHIN REACH.

## 2018-01-25 NOTE — NUR
Complaining of incision site discomfort. Dilaudid 4mg po givem. Inserted valencia catheter Fr 
18 per MD order.  Kept patient comfortable.

## 2018-01-25 NOTE — NUR
Midline re-insertion canceled, patient's midline access patent- flushed w/ 10 ml NS & it 
went through, no signs of filtration on the site. Patient awake & alert no SOB denies chest 
pain. Merrem antibiotic given.

## 2018-01-26 VITALS — DIASTOLIC BLOOD PRESSURE: 60 MMHG | SYSTOLIC BLOOD PRESSURE: 102 MMHG

## 2018-01-26 VITALS — SYSTOLIC BLOOD PRESSURE: 136 MMHG | DIASTOLIC BLOOD PRESSURE: 100 MMHG

## 2018-01-26 RX ADMIN — BACLOFEN SCH MG: 20 TABLET ORAL at 09:39

## 2018-01-26 RX ADMIN — Medication SCH MG: at 09:39

## 2018-01-26 RX ADMIN — HYDROMORPHONE HYDROCHLORIDE PRN MG: 2 TABLET ORAL at 06:03

## 2018-01-26 RX ADMIN — LACTOBACILLUS ACIDOPH-L.BULGARICUS 1 MILLION CELL CHEWABLE TABLET SCH TAB.CHEW: at 21:51

## 2018-01-26 RX ADMIN — BACLOFEN SCH MG: 20 TABLET ORAL at 13:15

## 2018-01-26 RX ADMIN — Medication SCH MG: at 21:52

## 2018-01-26 RX ADMIN — ZINC SULFATE CAP 220 MG (50 MG ELEMENTAL ZN) SCH MG: 220 (50 ZN) CAP at 09:36

## 2018-01-26 RX ADMIN — Medication SCH MG: at 21:51

## 2018-01-26 RX ADMIN — Medication SCH MG: at 13:15

## 2018-01-26 RX ADMIN — SENNOSIDES SCH TAB: 8.6 TABLET, COATED ORAL at 21:51

## 2018-01-26 RX ADMIN — SODIUM CHLORIDE SCH MLS/HR: 9 INJECTION, SOLUTION INTRAVENOUS at 13:17

## 2018-01-26 RX ADMIN — RIVAROXABAN SCH MG: 10 TABLET, FILM COATED ORAL at 17:22

## 2018-01-26 RX ADMIN — PANTOPRAZOLE SODIUM SCH MG: 40 TABLET, DELAYED RELEASE ORAL at 06:03

## 2018-01-26 RX ADMIN — HYDROMORPHONE HYDROCHLORIDE PRN MG: 2 TABLET ORAL at 01:01

## 2018-01-26 RX ADMIN — BACLOFEN SCH MG: 20 TABLET ORAL at 21:51

## 2018-01-26 RX ADMIN — SODIUM CHLORIDE SCH MLS/HR: 9 INJECTION, SOLUTION INTRAVENOUS at 06:02

## 2018-01-26 RX ADMIN — THERA TABS SCH UDTAB: TAB at 09:39

## 2018-01-26 RX ADMIN — SODIUM CHLORIDE SCH MLS/HR: 9 INJECTION, SOLUTION INTRAVENOUS at 21:54

## 2018-01-26 RX ADMIN — HYDROMORPHONE HYDROCHLORIDE PRN MG: 2 TABLET ORAL at 20:09

## 2018-01-26 RX ADMIN — LACTOBACILLUS ACIDOPH-L.BULGARICUS 1 MILLION CELL CHEWABLE TABLET SCH TAB.CHEW: at 09:36

## 2018-01-26 RX ADMIN — BACLOFEN SCH MG: 20 TABLET ORAL at 16:27

## 2018-01-26 RX ADMIN — Medication SCH MG: at 09:36

## 2018-01-26 RX ADMIN — DOCUSATE SODIUM SCH MG: 100 CAPSULE, LIQUID FILLED ORAL at 21:51

## 2018-01-26 RX ADMIN — DOCUSATE SODIUM SCH MG: 100 CAPSULE, LIQUID FILLED ORAL at 09:39

## 2018-01-26 RX ADMIN — HYDROMORPHONE HYDROCHLORIDE PRN MG: 2 TABLET ORAL at 13:16

## 2018-01-26 RX ADMIN — Medication SCH MG: at 16:27

## 2018-01-26 RX ADMIN — HYDROMORPHONE HYDROCHLORIDE PRN MG: 2 TABLET ORAL at 09:39

## 2018-01-26 RX ADMIN — Medication SCH MG: at 16:28

## 2018-01-26 RX ADMIN — NICOTINE SCH MG: 21 PATCH, EXTENDED RELEASE TOPICAL at 09:39

## 2018-01-26 NOTE — NUR
RECEIVED PATIENT IN BED, ALERT ORIENTED, LEFT UPPER ARM MIDLINE INTACT, CONT ON PAIN 
MANAGEMENT, KULKARNI CATH PATENT DRAINING WITH YELLOW COLOR URINE IN MODERATE AMOUNT, ALL NEEDS 
ATTENDED, TURN AND REPOSITION, CALL LIGHT WITHIN REACH.

## 2018-01-26 NOTE — NUR
pt complains of pain. pt states that oxycontin was discontinued and requests it to be 
continued. pt complains of aching pain radiating to the right hip 10/10 adult scale and 
started in the am on movement. pt given dilaudid for severe pain 10/10. will continue to 
monitor.

## 2018-01-26 NOTE — NUR
pt continues to be stable all day.incision site dressing changed. no signs of bleeding. md 
ordred to continue oxycontin 20mg q8. pt assisted to the bathroom . pt continues to urinate 
on valencia. no infection noted. sacrum continues to be intact. will continue to monitor.

## 2018-01-26 NOTE — NUR
pt. was not given vitamin B12 shot because it was prescribed for 7 days only. no signs of 
toxicity. notified pharmacy. pharmacy said to discontinue shot. will continue to monitor.

## 2018-01-26 NOTE — NUR
pt seen on rounding. pt complaints of pain. upon assessment pt was complaining of pain 7/10 
on right hip to lower back, throbbing and burning. pt states that she has chronic pain. md 
recommended to give pt oxycontin 20mg q8. will continue to monitor.

## 2018-01-27 VITALS — DIASTOLIC BLOOD PRESSURE: 66 MMHG | SYSTOLIC BLOOD PRESSURE: 116 MMHG

## 2018-01-27 VITALS — DIASTOLIC BLOOD PRESSURE: 88 MMHG | SYSTOLIC BLOOD PRESSURE: 136 MMHG

## 2018-01-27 RX ADMIN — Medication SCH MG: at 21:00

## 2018-01-27 RX ADMIN — HYDROMORPHONE HYDROCHLORIDE PRN MG: 2 TABLET ORAL at 08:50

## 2018-01-27 RX ADMIN — Medication SCH MG: at 17:30

## 2018-01-27 RX ADMIN — SENNOSIDES SCH TAB: 8.6 TABLET, COATED ORAL at 21:29

## 2018-01-27 RX ADMIN — RIVAROXABAN SCH MG: 10 TABLET, FILM COATED ORAL at 17:32

## 2018-01-27 RX ADMIN — Medication SCH MG: at 08:50

## 2018-01-27 RX ADMIN — DOCUSATE SODIUM SCH MG: 100 CAPSULE, LIQUID FILLED ORAL at 08:50

## 2018-01-27 RX ADMIN — PANTOPRAZOLE SODIUM SCH MG: 40 TABLET, DELAYED RELEASE ORAL at 05:28

## 2018-01-27 RX ADMIN — THERA TABS SCH UDTAB: TAB at 08:50

## 2018-01-27 RX ADMIN — DOCUSATE SODIUM SCH MG: 100 CAPSULE, LIQUID FILLED ORAL at 21:28

## 2018-01-27 RX ADMIN — BACLOFEN SCH MG: 20 TABLET ORAL at 17:30

## 2018-01-27 RX ADMIN — Medication SCH MG: at 05:19

## 2018-01-27 RX ADMIN — NICOTINE SCH MG: 21 PATCH, EXTENDED RELEASE TOPICAL at 08:50

## 2018-01-27 RX ADMIN — SODIUM CHLORIDE SCH MLS/HR: 9 INJECTION, SOLUTION INTRAVENOUS at 13:19

## 2018-01-27 RX ADMIN — BACLOFEN SCH MG: 20 TABLET ORAL at 21:29

## 2018-01-27 RX ADMIN — BACLOFEN SCH MG: 20 TABLET ORAL at 08:50

## 2018-01-27 RX ADMIN — SODIUM CHLORIDE SCH MLS/HR: 9 INJECTION, SOLUTION INTRAVENOUS at 05:19

## 2018-01-27 RX ADMIN — BACLOFEN SCH MG: 20 TABLET ORAL at 12:28

## 2018-01-27 RX ADMIN — Medication SCH MG: at 13:19

## 2018-01-27 RX ADMIN — SODIUM CHLORIDE SCH MLS/HR: 9 INJECTION, SOLUTION INTRAVENOUS at 21:29

## 2018-01-27 RX ADMIN — HYDROMORPHONE HYDROCHLORIDE PRN MG: 2 TABLET ORAL at 17:30

## 2018-01-27 RX ADMIN — Medication SCH MG: at 23:00

## 2018-01-27 RX ADMIN — Medication SCH MG: at 12:28

## 2018-01-27 RX ADMIN — HYDROMORPHONE HYDROCHLORIDE PRN MG: 2 TABLET ORAL at 21:30

## 2018-01-27 RX ADMIN — HYDROMORPHONE HYDROCHLORIDE PRN MG: 2 TABLET ORAL at 12:28

## 2018-01-27 RX ADMIN — LACTOBACILLUS ACIDOPH-L.BULGARICUS 1 MILLION CELL CHEWABLE TABLET SCH TAB.CHEW: at 21:28

## 2018-01-27 RX ADMIN — LACTOBACILLUS ACIDOPH-L.BULGARICUS 1 MILLION CELL CHEWABLE TABLET SCH TAB.CHEW: at 08:50

## 2018-01-27 RX ADMIN — ZINC SULFATE CAP 220 MG (50 MG ELEMENTAL ZN) SCH MG: 220 (50 ZN) CAP at 08:50

## 2018-01-27 NOTE — NUR
PT RECEIVED IN BED, AWAKE. A/OX4. ABLE TO MAKE NEEDS KNOWN. V/S STABLE. IN NO ACUTE 
DISTRESS. PT C/O OF RIGHT HIP PAIN 8/10 AT THIS TIME. TO ADMIN PAIN MEDICATION AS ORDERED. 
IV INTACT AND PATENT, SALINE LOCKED AT THIS TIME. ON RA, TOLERATING WELL. AFEBRILE. ON FIRST 
STEP MATTRESS, WITH HOB ELEVATED. OFFLOADING SACRAL/COCCYX REGION. KULKARNI INTACT AND PATENT. 
CONTACT ISOLATION IN PLACE. SAFETY MEASURES IMPLEMENTED. CALL LIGHT WITHIN REACH.

## 2018-01-27 NOTE — NUR
pt seen on rounding. pt premedicated with pain meds prior to therapy. no new injuries noted. 
no bleeding on incision site. pt given meds whole. valencia intact. vitals stable. will 
continue to monitor.

## 2018-01-27 NOTE — NUR
pt seen by dr austin. pt prescribed toradol. pt not given toradol due to allergy to 
ibuprofen. md notified and said he will see pt tomorrow and give dilaudid prn for pain. md gomez said that if toradol is given hold xarelto. toradol not given and xarelto given. pt 
continues to complain of back pain. iv flushed and intact. will endorse to night shift 
nurse.

## 2018-01-27 NOTE — NUR
PATIENT SLEPT MOST OF THE NIGHT NO SOB NO CHEST PAIN, MIDLINE ON LEFT UPPER ARM, PATENT, 
INTACT, FLUSHED THE LINE VERY WELL, CONT ON PAIN MANAGEMENT OF THE BACK, TURN AND 
REPOSITION, KULKARNI CATH PATENT, DRAINING WITH YELLOW COLOR URINE IN MODERATE AMOUNT, CONT TO 
MONITOR.

## 2018-01-28 VITALS — SYSTOLIC BLOOD PRESSURE: 140 MMHG | DIASTOLIC BLOOD PRESSURE: 94 MMHG

## 2018-01-28 VITALS — SYSTOLIC BLOOD PRESSURE: 128 MMHG | DIASTOLIC BLOOD PRESSURE: 68 MMHG

## 2018-01-28 RX ADMIN — BACLOFEN SCH MG: 20 TABLET ORAL at 12:19

## 2018-01-28 RX ADMIN — Medication SCH MG: at 12:19

## 2018-01-28 RX ADMIN — HYDROMORPHONE HYDROCHLORIDE PRN MG: 2 TABLET ORAL at 09:11

## 2018-01-28 RX ADMIN — ZINC SULFATE CAP 220 MG (50 MG ELEMENTAL ZN) SCH MG: 220 (50 ZN) CAP at 08:28

## 2018-01-28 RX ADMIN — CYCLOBENZAPRINE PRN MG: 10 TABLET, FILM COATED ORAL at 22:13

## 2018-01-28 RX ADMIN — Medication SCH MG: at 21:23

## 2018-01-28 RX ADMIN — Medication SCH MG: at 06:00

## 2018-01-28 RX ADMIN — SODIUM CHLORIDE SCH MLS/HR: 9 INJECTION, SOLUTION INTRAVENOUS at 13:10

## 2018-01-28 RX ADMIN — DEXAMETHASONE SODIUM PHOSPHATE SCH MG: 4 INJECTION, SOLUTION INTRAMUSCULAR; INTRAVENOUS at 22:13

## 2018-01-28 RX ADMIN — SODIUM CHLORIDE SCH MLS/HR: 9 INJECTION, SOLUTION INTRAVENOUS at 22:13

## 2018-01-28 RX ADMIN — BACLOFEN SCH MG: 20 TABLET ORAL at 17:05

## 2018-01-28 RX ADMIN — Medication SCH MG: at 08:28

## 2018-01-28 RX ADMIN — Medication SCH MG: at 17:05

## 2018-01-28 RX ADMIN — HYDROMORPHONE HYDROCHLORIDE PRN MG: 2 TABLET ORAL at 05:01

## 2018-01-28 RX ADMIN — SODIUM CHLORIDE SCH MLS/HR: 9 INJECTION, SOLUTION INTRAVENOUS at 05:00

## 2018-01-28 RX ADMIN — Medication SCH MG: at 18:17

## 2018-01-28 RX ADMIN — BACLOFEN SCH MG: 20 TABLET ORAL at 21:23

## 2018-01-28 RX ADMIN — DOCUSATE SODIUM SCH MG: 100 CAPSULE, LIQUID FILLED ORAL at 21:00

## 2018-01-28 RX ADMIN — RIVAROXABAN SCH MG: 10 TABLET, FILM COATED ORAL at 18:27

## 2018-01-28 RX ADMIN — PANTOPRAZOLE SODIUM SCH MG: 40 TABLET, DELAYED RELEASE ORAL at 06:00

## 2018-01-28 RX ADMIN — HYDROMORPHONE HYDROCHLORIDE PRN MG: 2 TABLET ORAL at 21:21

## 2018-01-28 RX ADMIN — BACLOFEN SCH MG: 20 TABLET ORAL at 08:28

## 2018-01-28 RX ADMIN — HYDROMORPHONE HYDROCHLORIDE PRN MG: 2 TABLET ORAL at 17:26

## 2018-01-28 RX ADMIN — DOCUSATE SODIUM SCH MG: 100 CAPSULE, LIQUID FILLED ORAL at 09:10

## 2018-01-28 RX ADMIN — LACTOBACILLUS ACIDOPH-L.BULGARICUS 1 MILLION CELL CHEWABLE TABLET SCH TAB.CHEW: at 08:28

## 2018-01-28 RX ADMIN — LACTOBACILLUS ACIDOPH-L.BULGARICUS 1 MILLION CELL CHEWABLE TABLET SCH TAB.CHEW: at 21:23

## 2018-01-28 RX ADMIN — THERA TABS SCH UDTAB: TAB at 08:28

## 2018-01-28 RX ADMIN — HYDROMORPHONE HYDROCHLORIDE PRN MG: 2 TABLET ORAL at 13:20

## 2018-01-28 RX ADMIN — SENNOSIDES SCH TAB: 8.6 TABLET, COATED ORAL at 21:00

## 2018-01-28 NOTE — NUR
END OF SHIFT NOTES. PT SLEPT WELL THROUGHOUT SHIFT. IN STABLE CONDITION. PT C/O OF RIGHT HIP 
PAIN MANAGED, PAIN MEDICATION ADMIN. IV ABX INFUSED. KULKARNI CONT TO BE INTACT AND PATENT. 
ISOLATION PRECAUTIONS MAINTAINED. ALL NEEDS ATTENDED. SAFETY MAINTAINED. CALL LIGHT WITHIN 
REACH.

## 2018-01-28 NOTE — NUR
Patient seen by Dr. Marrufo with order for  Robaxin 500mg  IV Q8hrs PRN and Decadron 4mg IV 
Q8hrs x 2 days. Order carried out. Patient aware.

## 2018-01-28 NOTE — NUR
Patient received from day shift nurse. Patient laying in bed, awake, alert and with no signs 
of sob, or acute distress. Pertinent assessment completed. Vital signs taken at start of 
shift & stable. Complained of pain 7/10, explained to patient pain meds were given 1-2 hrs 
ago. Call light within reach of pt. will continue to monitor pt through shift

## 2018-01-28 NOTE — NUR
Notified Dr. Ho regarding patient's request to have an MRI done because of severe low back 
pain every time she moves. MD ordered MRI L spine without contrast. Patient made aware.

## 2018-01-28 NOTE — NUR
Patient states that pain is relieved with pain medications as ordered to pain level 1-3 but 
with movement pain becomes severe so she wants to get her PRN pain medication frequently and 
avoids movement.

## 2018-01-28 NOTE — NUR
Received patient awake, up on bed, verbally responsive, coherent, not in any form of acute 
distress. She denies any pain or discomfort at this time. Call light placed within reach.

## 2018-01-29 VITALS — DIASTOLIC BLOOD PRESSURE: 69 MMHG | SYSTOLIC BLOOD PRESSURE: 122 MMHG

## 2018-01-29 VITALS — DIASTOLIC BLOOD PRESSURE: 81 MMHG | SYSTOLIC BLOOD PRESSURE: 147 MMHG

## 2018-01-29 RX ADMIN — HYDROMORPHONE HYDROCHLORIDE PRN MG: 2 TABLET ORAL at 21:26

## 2018-01-29 RX ADMIN — Medication SCH MG: at 08:39

## 2018-01-29 RX ADMIN — BACLOFEN SCH MG: 20 TABLET ORAL at 17:29

## 2018-01-29 RX ADMIN — DEXAMETHASONE SODIUM PHOSPHATE SCH MG: 4 INJECTION, SOLUTION INTRAMUSCULAR; INTRAVENOUS at 05:59

## 2018-01-29 RX ADMIN — THERA TABS SCH UDTAB: TAB at 08:29

## 2018-01-29 RX ADMIN — Medication SCH MG: at 17:29

## 2018-01-29 RX ADMIN — Medication SCH MG: at 17:30

## 2018-01-29 RX ADMIN — Medication SCH MG: at 13:32

## 2018-01-29 RX ADMIN — Medication SCH MG: at 00:29

## 2018-01-29 RX ADMIN — Medication SCH MG: at 06:07

## 2018-01-29 RX ADMIN — RIVAROXABAN SCH MG: 10 TABLET, FILM COATED ORAL at 17:33

## 2018-01-29 RX ADMIN — BACLOFEN SCH MG: 20 TABLET ORAL at 08:30

## 2018-01-29 RX ADMIN — LACTOBACILLUS ACIDOPH-L.BULGARICUS 1 MILLION CELL CHEWABLE TABLET SCH TAB.CHEW: at 21:21

## 2018-01-29 RX ADMIN — DEXAMETHASONE SODIUM PHOSPHATE SCH MG: 4 INJECTION, SOLUTION INTRAMUSCULAR; INTRAVENOUS at 13:32

## 2018-01-29 RX ADMIN — SENNOSIDES SCH TAB: 8.6 TABLET, COATED ORAL at 21:21

## 2018-01-29 RX ADMIN — Medication SCH MG: at 08:30

## 2018-01-29 RX ADMIN — SODIUM CHLORIDE SCH MLS/HR: 9 INJECTION, SOLUTION INTRAVENOUS at 22:16

## 2018-01-29 RX ADMIN — LACTOBACILLUS ACIDOPH-L.BULGARICUS 1 MILLION CELL CHEWABLE TABLET SCH TAB.CHEW: at 08:30

## 2018-01-29 RX ADMIN — HYDROMORPHONE HYDROCHLORIDE PRN MG: 2 TABLET ORAL at 08:31

## 2018-01-29 RX ADMIN — DOCUSATE SODIUM SCH MG: 100 CAPSULE, LIQUID FILLED ORAL at 08:30

## 2018-01-29 RX ADMIN — SODIUM CHLORIDE SCH MLS/HR: 9 INJECTION, SOLUTION INTRAVENOUS at 05:59

## 2018-01-29 RX ADMIN — PANTOPRAZOLE SODIUM SCH MG: 40 TABLET, DELAYED RELEASE ORAL at 06:47

## 2018-01-29 RX ADMIN — CYCLOBENZAPRINE PRN MG: 10 TABLET, FILM COATED ORAL at 06:47

## 2018-01-29 RX ADMIN — BACLOFEN SCH MG: 20 TABLET ORAL at 13:32

## 2018-01-29 RX ADMIN — DOCUSATE SODIUM SCH MG: 100 CAPSULE, LIQUID FILLED ORAL at 21:21

## 2018-01-29 RX ADMIN — BACLOFEN SCH MG: 20 TABLET ORAL at 21:21

## 2018-01-29 RX ADMIN — ZINC SULFATE CAP 220 MG (50 MG ELEMENTAL ZN) SCH MG: 220 (50 ZN) CAP at 08:30

## 2018-01-29 RX ADMIN — DEXAMETHASONE SODIUM PHOSPHATE SCH MG: 4 INJECTION, SOLUTION INTRAMUSCULAR; INTRAVENOUS at 22:16

## 2018-01-29 RX ADMIN — SODIUM CHLORIDE SCH MLS/HR: 9 INJECTION, SOLUTION INTRAVENOUS at 13:32

## 2018-01-29 RX ADMIN — HYDROMORPHONE HYDROCHLORIDE PRN MG: 2 TABLET ORAL at 16:27

## 2018-01-30 VITALS — SYSTOLIC BLOOD PRESSURE: 126 MMHG | DIASTOLIC BLOOD PRESSURE: 66 MMHG

## 2018-01-30 VITALS — DIASTOLIC BLOOD PRESSURE: 86 MMHG | SYSTOLIC BLOOD PRESSURE: 147 MMHG

## 2018-01-30 RX ADMIN — HYDROMORPHONE HYDROCHLORIDE PRN MG: 2 TABLET ORAL at 18:28

## 2018-01-30 RX ADMIN — Medication SCH MG: at 00:07

## 2018-01-30 RX ADMIN — ZINC SULFATE CAP 220 MG (50 MG ELEMENTAL ZN) SCH MG: 220 (50 ZN) CAP at 09:41

## 2018-01-30 RX ADMIN — Medication SCH MG: at 13:10

## 2018-01-30 RX ADMIN — SODIUM CHLORIDE SCH MLS/HR: 9 INJECTION, SOLUTION INTRAVENOUS at 21:11

## 2018-01-30 RX ADMIN — BACLOFEN SCH MG: 20 TABLET ORAL at 21:09

## 2018-01-30 RX ADMIN — DEXAMETHASONE SODIUM PHOSPHATE SCH MG: 4 INJECTION, SOLUTION INTRAMUSCULAR; INTRAVENOUS at 14:15

## 2018-01-30 RX ADMIN — BACLOFEN SCH MG: 20 TABLET ORAL at 18:07

## 2018-01-30 RX ADMIN — HYDROMORPHONE HYDROCHLORIDE PRN MG: 2 TABLET ORAL at 10:16

## 2018-01-30 RX ADMIN — DOCUSATE SODIUM SCH MG: 100 CAPSULE, LIQUID FILLED ORAL at 21:09

## 2018-01-30 RX ADMIN — BACLOFEN SCH MG: 20 TABLET ORAL at 09:40

## 2018-01-30 RX ADMIN — LACTOBACILLUS ACIDOPH-L.BULGARICUS 1 MILLION CELL CHEWABLE TABLET SCH TAB.CHEW: at 09:39

## 2018-01-30 RX ADMIN — DOCUSATE SODIUM SCH MG: 100 CAPSULE, LIQUID FILLED ORAL at 10:15

## 2018-01-30 RX ADMIN — BACLOFEN SCH MG: 20 TABLET ORAL at 13:10

## 2018-01-30 RX ADMIN — Medication SCH MG: at 09:39

## 2018-01-30 RX ADMIN — Medication SCH MG: at 05:44

## 2018-01-30 RX ADMIN — DEXAMETHASONE SODIUM PHOSPHATE SCH MG: 4 INJECTION, SOLUTION INTRAMUSCULAR; INTRAVENOUS at 05:44

## 2018-01-30 RX ADMIN — THERA TABS SCH UDTAB: TAB at 09:39

## 2018-01-30 RX ADMIN — LACTOBACILLUS ACIDOPH-L.BULGARICUS 1 MILLION CELL CHEWABLE TABLET SCH TAB.CHEW: at 21:09

## 2018-01-30 RX ADMIN — RIVAROXABAN SCH MG: 10 TABLET, FILM COATED ORAL at 18:31

## 2018-01-30 RX ADMIN — TOLTERODINE TARTRATE SCH MG: 2 CAPSULE, EXTENDED RELEASE ORAL at 09:40

## 2018-01-30 RX ADMIN — SODIUM CHLORIDE SCH MLS/HR: 9 INJECTION, SOLUTION INTRAVENOUS at 14:22

## 2018-01-30 RX ADMIN — SENNOSIDES SCH TAB: 8.6 TABLET, COATED ORAL at 21:09

## 2018-01-30 RX ADMIN — Medication SCH MG: at 18:07

## 2018-01-30 RX ADMIN — Medication SCH MG: at 09:40

## 2018-01-30 RX ADMIN — SODIUM CHLORIDE SCH MLS/HR: 9 INJECTION, SOLUTION INTRAVENOUS at 05:33

## 2018-01-30 RX ADMIN — Medication SCH MG: at 12:01

## 2018-01-30 RX ADMIN — Medication SCH MG: at 18:06

## 2018-01-30 RX ADMIN — PANTOPRAZOLE SODIUM SCH MG: 40 TABLET, DELAYED RELEASE ORAL at 06:27

## 2018-01-30 RX ADMIN — HYDROMORPHONE HYDROCHLORIDE PRN MG: 2 TABLET ORAL at 22:49

## 2018-01-30 NOTE — NUR
Patient received from day shift nurse. Shift report at bedside. Patient currently lying in 
bed comfortably with no signs of pain, sob, or acute distress. A/O x4 & able to make needs 
known. Pertinent assessment completed. Vital signs WNL. Patient noted with left upper arm 
midline clean & patent. Pinzon cath bag checked off the floor & below the level of the 
bladder. Urine flowing through to bag. Call light placed within reach of patient. Will 
continue to monitor patient through shift.

## 2018-01-30 NOTE — NUR
Pt seen by Dr. Yaron APONTE approved Pt to continue participating with PT and OT starting 
tomorrow. Pt made aware and plan of care discussed. Will endorse to night shift.

## 2018-01-30 NOTE — NUR
Pt sitting up comfortably in bed watching TV. Pt seen by DELANEY Aguiar, see notes for further 
evaluation. Pt reports pain 6/10, Dilaudid administered per PRN orders. Pt still not cleared 
for PT, pending Spinal surgical consult or Dr. Marrufo approval to continue OT and PT. MD 
made aware. Pt clean and dry. Pt ate 100% of dinner. Call light and personal items within 
reach. Pt compliant with all routine medications. Will continue to monitor and endorse to 
oncoming night shift.

## 2018-01-30 NOTE — NUR
SBAR report received near bedside, board updated. Pt assessed, pain reported to be managed 
with scheduled medication as ordered. No SOB or acute distress noted at this time. Pt 
compliant with all routine morning medications. All safety and comfort measures met. Bed in 
locked and low position, with side rails up x2. Personal belongings and call light placed 
within reach. Plan for today discussed with Pt especially the need for PT and OT to be 
postponed until being reassessed by MD or Dr. Painting for approval. Will continue to monitor 
and follow up with Dr. Painting regarding need for Neurology consult.

## 2018-01-31 VITALS — SYSTOLIC BLOOD PRESSURE: 135 MMHG | DIASTOLIC BLOOD PRESSURE: 86 MMHG

## 2018-01-31 VITALS — DIASTOLIC BLOOD PRESSURE: 78 MMHG | SYSTOLIC BLOOD PRESSURE: 135 MMHG

## 2018-01-31 RX ADMIN — HYDROMORPHONE HYDROCHLORIDE PRN MG: 2 TABLET ORAL at 13:31

## 2018-01-31 RX ADMIN — SENNOSIDES SCH TAB: 8.6 TABLET, COATED ORAL at 21:30

## 2018-01-31 RX ADMIN — SODIUM CHLORIDE SCH MLS/HR: 9 INJECTION, SOLUTION INTRAVENOUS at 05:13

## 2018-01-31 RX ADMIN — Medication SCH MG: at 16:37

## 2018-01-31 RX ADMIN — BACLOFEN SCH MG: 20 TABLET ORAL at 09:17

## 2018-01-31 RX ADMIN — Medication SCH MG: at 09:18

## 2018-01-31 RX ADMIN — DOCUSATE SODIUM SCH MG: 100 CAPSULE, LIQUID FILLED ORAL at 21:30

## 2018-01-31 RX ADMIN — TOLTERODINE TARTRATE SCH MG: 2 CAPSULE, EXTENDED RELEASE ORAL at 09:18

## 2018-01-31 RX ADMIN — Medication SCH MG: at 12:25

## 2018-01-31 RX ADMIN — LACTOBACILLUS ACIDOPH-L.BULGARICUS 1 MILLION CELL CHEWABLE TABLET SCH TAB.CHEW: at 21:30

## 2018-01-31 RX ADMIN — BACLOFEN SCH MG: 20 TABLET ORAL at 16:37

## 2018-01-31 RX ADMIN — DOCUSATE SODIUM SCH MG: 100 CAPSULE, LIQUID FILLED ORAL at 09:17

## 2018-01-31 RX ADMIN — LACTOBACILLUS ACIDOPH-L.BULGARICUS 1 MILLION CELL CHEWABLE TABLET SCH TAB.CHEW: at 09:18

## 2018-01-31 RX ADMIN — RIVAROXABAN SCH MG: 10 TABLET, FILM COATED ORAL at 16:38

## 2018-01-31 RX ADMIN — SODIUM CHLORIDE SCH MLS/HR: 9 INJECTION, SOLUTION INTRAVENOUS at 12:26

## 2018-01-31 RX ADMIN — SODIUM CHLORIDE SCH MLS/HR: 9 INJECTION, SOLUTION INTRAVENOUS at 21:31

## 2018-01-31 RX ADMIN — HYDROMORPHONE HYDROCHLORIDE PRN MG: 2 TABLET ORAL at 09:26

## 2018-01-31 RX ADMIN — HYDROMORPHONE HYDROCHLORIDE PRN MG: 2 TABLET ORAL at 22:41

## 2018-01-31 RX ADMIN — Medication SCH MG: at 00:09

## 2018-01-31 RX ADMIN — HYDROMORPHONE HYDROCHLORIDE PRN MG: 2 TABLET ORAL at 12:29

## 2018-01-31 RX ADMIN — BACLOFEN SCH MG: 20 TABLET ORAL at 12:25

## 2018-01-31 RX ADMIN — DEXAMETHASONE SODIUM PHOSPHATE SCH MG: 4 INJECTION, SOLUTION INTRAMUSCULAR; INTRAVENOUS at 21:30

## 2018-01-31 RX ADMIN — HYDROMORPHONE HYDROCHLORIDE PRN MG: 2 TABLET ORAL at 18:39

## 2018-01-31 RX ADMIN — CYCLOBENZAPRINE PRN MG: 10 TABLET, FILM COATED ORAL at 09:19

## 2018-01-31 RX ADMIN — PANTOPRAZOLE SODIUM SCH MG: 40 TABLET, DELAYED RELEASE ORAL at 06:03

## 2018-01-31 RX ADMIN — CYCLOBENZAPRINE SCH MG: 10 TABLET, FILM COATED ORAL at 21:30

## 2018-01-31 RX ADMIN — THERA TABS SCH UDTAB: TAB at 09:18

## 2018-01-31 RX ADMIN — ZINC SULFATE CAP 220 MG (50 MG ELEMENTAL ZN) SCH MG: 220 (50 ZN) CAP at 09:18

## 2018-01-31 RX ADMIN — Medication SCH MG: at 05:14

## 2018-01-31 RX ADMIN — Medication SCH MG: at 09:17

## 2018-01-31 RX ADMIN — Medication PRN MG: at 22:42

## 2018-01-31 NOTE — NUR
Patient stable through shift. no acute changes occurred. Vital signs WNL. Slept 
intermittently through the shift. All needs attended to. All meds administered as ordered 
per MD. Safety measures implemented. Call light within reach of patient. Will endorse to day 
shift nurse.

## 2018-01-31 NOTE — NUR
Received pt in bed, awake and alert; verbally responsive and able to make needs known. 
 at bedside. No acute distress noted. Denies pain or discomfort currently. Pinzon cath 
noted to be draining well with clear, yellow urine. Denies discomfort. Midline patent and 
intact on left upper arm. All safety measures and fall precautions maintained. Call light 
and all personal belongings within reach. Will continue to monitor.

## 2018-02-01 VITALS — DIASTOLIC BLOOD PRESSURE: 90 MMHG | SYSTOLIC BLOOD PRESSURE: 130 MMHG

## 2018-02-01 VITALS — SYSTOLIC BLOOD PRESSURE: 139 MMHG | DIASTOLIC BLOOD PRESSURE: 88 MMHG

## 2018-02-01 RX ADMIN — CYCLOBENZAPRINE SCH MG: 10 TABLET, FILM COATED ORAL at 16:54

## 2018-02-01 RX ADMIN — BACLOFEN SCH MG: 10 TABLET ORAL at 12:32

## 2018-02-01 RX ADMIN — Medication SCH MG: at 06:59

## 2018-02-01 RX ADMIN — CYCLOBENZAPRINE SCH MG: 10 TABLET, FILM COATED ORAL at 09:03

## 2018-02-01 RX ADMIN — Medication SCH MG: at 12:32

## 2018-02-01 RX ADMIN — BACLOFEN SCH MG: 10 TABLET ORAL at 16:54

## 2018-02-01 RX ADMIN — Medication SCH MG: at 12:31

## 2018-02-01 RX ADMIN — CYCLOBENZAPRINE SCH MG: 10 TABLET, FILM COATED ORAL at 21:14

## 2018-02-01 RX ADMIN — PANTOPRAZOLE SODIUM SCH MG: 40 TABLET, DELAYED RELEASE ORAL at 06:59

## 2018-02-01 RX ADMIN — THERA TABS SCH UDTAB: TAB at 09:02

## 2018-02-01 RX ADMIN — LACTOBACILLUS ACIDOPH-L.BULGARICUS 1 MILLION CELL CHEWABLE TABLET SCH TAB.CHEW: at 09:02

## 2018-02-01 RX ADMIN — CYCLOBENZAPRINE SCH MG: 10 TABLET, FILM COATED ORAL at 12:31

## 2018-02-01 RX ADMIN — HYDROMORPHONE HYDROCHLORIDE PRN MG: 2 TABLET ORAL at 09:05

## 2018-02-01 RX ADMIN — ZINC SULFATE CAP 220 MG (50 MG ELEMENTAL ZN) SCH MG: 220 (50 ZN) CAP at 09:03

## 2018-02-01 RX ADMIN — HYDROMORPHONE HYDROCHLORIDE PRN MG: 2 TABLET ORAL at 13:31

## 2018-02-01 RX ADMIN — LACTOBACILLUS ACIDOPH-L.BULGARICUS 1 MILLION CELL CHEWABLE TABLET SCH TAB.CHEW: at 21:13

## 2018-02-01 RX ADMIN — DOCUSATE SODIUM SCH MG: 100 CAPSULE, LIQUID FILLED ORAL at 09:03

## 2018-02-01 RX ADMIN — Medication SCH MG: at 09:03

## 2018-02-01 RX ADMIN — BACLOFEN SCH MG: 10 TABLET ORAL at 09:03

## 2018-02-01 RX ADMIN — Medication PRN MG: at 21:15

## 2018-02-01 RX ADMIN — Medication SCH MG: at 00:06

## 2018-02-01 RX ADMIN — HYDROMORPHONE HYDROCHLORIDE PRN MG: 2 TABLET ORAL at 20:08

## 2018-02-01 RX ADMIN — SENNOSIDES SCH TAB: 8.6 TABLET, COATED ORAL at 21:13

## 2018-02-01 RX ADMIN — Medication SCH MG: at 16:55

## 2018-02-01 RX ADMIN — DEXAMETHASONE SODIUM PHOSPHATE SCH MG: 4 INJECTION, SOLUTION INTRAMUSCULAR; INTRAVENOUS at 09:03

## 2018-02-01 RX ADMIN — SODIUM CHLORIDE SCH MLS/HR: 9 INJECTION, SOLUTION INTRAVENOUS at 06:59

## 2018-02-01 RX ADMIN — RIVAROXABAN SCH MG: 10 TABLET, FILM COATED ORAL at 16:59

## 2018-02-01 RX ADMIN — DOCUSATE SODIUM SCH MG: 100 CAPSULE, LIQUID FILLED ORAL at 21:13

## 2018-02-01 RX ADMIN — Medication SCH MG: at 16:54

## 2018-02-01 RX ADMIN — Medication SCH MG: at 09:04

## 2018-02-01 RX ADMIN — TOLTERODINE TARTRATE SCH MG: 2 CAPSULE, EXTENDED RELEASE ORAL at 09:03

## 2018-02-01 NOTE — NUR
patient noted sitting up in bed, complains of back pain and request pain medicine, no signs 
of distress, call light in reach, bed locked and in lowest position

## 2018-02-01 NOTE — NUR
Midline on left upper arm patent and intact. Pinzon cath noted to be draining well with 
clear, yellow urine. Denies pain or discomfort. Safety maintained. Will continue to monitor.

## 2018-02-01 NOTE — NUR
Pt slept comfortably throughout shift. No acute distress noted. Verbally responsive and able 
to make needs known. Denies pain or discomfort. All needs anticipated and met accordingly. 
Kept clean and dry for comfort. All due medications given as ordered per MD and tolerated 
well. Medicated for pain PRN per MD order, well tolerated. Midline patent and intact. Pinzon 
catheter draining well with clear, yellow urine. Call light and all personal belongings 
within reach. All safety measures and fall precautions maintained. Will continue to monitor. 
Will endorse to day shift.

## 2018-02-01 NOTE — NUR
Received pt in bed, AAO x 4 watching television. Verbally responsive and able to make needs 
known. Complaining of pain in lower back 6/10 pain scale, stating it "hurts with movement". 
Medicated with pain medication PRN per MD order. Well tolerated. All safety measures and 
fall precautions Will continue to monitor.

## 2018-02-02 VITALS — DIASTOLIC BLOOD PRESSURE: 86 MMHG | SYSTOLIC BLOOD PRESSURE: 155 MMHG

## 2018-02-02 VITALS — SYSTOLIC BLOOD PRESSURE: 127 MMHG | DIASTOLIC BLOOD PRESSURE: 64 MMHG

## 2018-02-02 LAB
APPEARANCE UR: CLEAR
BILIRUB UR QL STRIP: NEGATIVE
COLOR UR: YELLOW
DEPRECATED SQUAMOUS URNS QL MICRO: (no result) /HPF
GLUCOSE UR STRIP-MCNC: NEGATIVE MG/DL
HGB UR QL STRIP: NEGATIVE
KETONES UR STRIP-MCNC: NEGATIVE MG/DL
LEUKOCYTE ESTERASE UR QL STRIP: NEGATIVE
NITRITE UR QL STRIP: NEGATIVE
PH UR STRIP: 5.5 [PH] (ref 5–8)
PROT UR QL STRIP: NEGATIVE
RBC #/AREA URNS HPF: (no result) /HPF (ref 0–3)
SP GR UR STRIP: <=1.005 (ref 1–1.03)
UROBILINOGEN UR STRIP-MCNC: 0.2 E.U./DL
WBC #/AREA URNS HPF: (no result) /HPF
WBC #/AREA URNS HPF: (no result) /HPF (ref 0–3)

## 2018-02-02 RX ADMIN — Medication SCH MG: at 09:03

## 2018-02-02 RX ADMIN — SENNOSIDES SCH TAB: 8.6 TABLET, COATED ORAL at 20:07

## 2018-02-02 RX ADMIN — CYCLOBENZAPRINE SCH MG: 10 TABLET, FILM COATED ORAL at 20:07

## 2018-02-02 RX ADMIN — Medication SCH MG: at 17:23

## 2018-02-02 RX ADMIN — Medication SCH MG: at 17:24

## 2018-02-02 RX ADMIN — Medication SCH MG: at 06:44

## 2018-02-02 RX ADMIN — DEXAMETHASONE SODIUM PHOSPHATE SCH MG: 4 INJECTION, SOLUTION INTRAMUSCULAR; INTRAVENOUS at 21:35

## 2018-02-02 RX ADMIN — Medication SCH MG: at 00:13

## 2018-02-02 RX ADMIN — THERA TABS SCH UDTAB: TAB at 09:03

## 2018-02-02 RX ADMIN — CYCLOBENZAPRINE SCH MG: 10 TABLET, FILM COATED ORAL at 17:23

## 2018-02-02 RX ADMIN — DOCUSATE SODIUM SCH MG: 100 CAPSULE, LIQUID FILLED ORAL at 09:03

## 2018-02-02 RX ADMIN — Medication SCH MG: at 06:45

## 2018-02-02 RX ADMIN — LACTOBACILLUS ACIDOPH-L.BULGARICUS 1 MILLION CELL CHEWABLE TABLET SCH TAB.CHEW: at 09:02

## 2018-02-02 RX ADMIN — Medication SCH MG: at 23:18

## 2018-02-02 RX ADMIN — TOLTERODINE TARTRATE SCH MG: 2 CAPSULE, EXTENDED RELEASE ORAL at 09:04

## 2018-02-02 RX ADMIN — BACLOFEN SCH MG: 10 TABLET ORAL at 12:05

## 2018-02-02 RX ADMIN — Medication SCH MG: at 12:06

## 2018-02-02 RX ADMIN — HYDROMORPHONE HYDROCHLORIDE PRN MG: 2 TABLET ORAL at 12:06

## 2018-02-02 RX ADMIN — HYDROMORPHONE HYDROCHLORIDE PRN MG: 2 TABLET ORAL at 09:03

## 2018-02-02 RX ADMIN — CYCLOBENZAPRINE SCH MG: 10 TABLET, FILM COATED ORAL at 09:03

## 2018-02-02 RX ADMIN — BACLOFEN SCH MG: 10 TABLET ORAL at 17:23

## 2018-02-02 RX ADMIN — RIVAROXABAN SCH MG: 10 TABLET, FILM COATED ORAL at 17:25

## 2018-02-02 RX ADMIN — Medication SCH MG: at 12:05

## 2018-02-02 RX ADMIN — CYCLOBENZAPRINE SCH MG: 10 TABLET, FILM COATED ORAL at 12:06

## 2018-02-02 RX ADMIN — HYDROMORPHONE HYDROCHLORIDE PRN MG: 2 TABLET ORAL at 20:08

## 2018-02-02 RX ADMIN — ZINC SULFATE CAP 220 MG (50 MG ELEMENTAL ZN) SCH MG: 220 (50 ZN) CAP at 09:03

## 2018-02-02 RX ADMIN — DOCUSATE SODIUM SCH MG: 100 CAPSULE, LIQUID FILLED ORAL at 20:07

## 2018-02-02 RX ADMIN — PANTOPRAZOLE SODIUM SCH MG: 40 TABLET, DELAYED RELEASE ORAL at 06:44

## 2018-02-02 RX ADMIN — Medication PRN MG: at 20:08

## 2018-02-02 RX ADMIN — LACTOBACILLUS ACIDOPH-L.BULGARICUS 1 MILLION CELL CHEWABLE TABLET SCH TAB.CHEW: at 20:08

## 2018-02-02 RX ADMIN — BACLOFEN SCH MG: 10 TABLET ORAL at 09:03

## 2018-02-02 NOTE — NUR
Pt resting comfortably in bed. AAO x4. No acute distress noted. C/o pain on the back, will 
f/u with proper intervention. Left upper arm midline, patent and intact. Pinzon catheter 
intact and draining clear yellow colored urine. Safety measures maintained. Bed alarm on. 
Call light and personal belongings within reach. Will continue to monitor.

## 2018-02-02 NOTE — NUR
Pt slept comfortably throughout the shift, no complaints of pain or discomfort. No acute 
distress. All due medications given as ordered, well tolerated. Kept clean and dry. All 
needs anticipated and met accordingly. All safety measures and fall precautions maintained. 
Call light and all personal belongings within reach. Will continue to monitor.

## 2018-02-02 NOTE — NUR
pt stable throuhgout the day. pt seen by NP. np aware of results of UA. no new orders. pain 
managed throughout the day. vitals stable. no new injuries. will endorse to night shift 
nurse.

## 2018-02-02 NOTE — NUR
pt seen on rounding. pt continues to be alert and oriented. isolation precautions 
implemented. iv site intact. vitals stable. pt complaints of pain on back. pt given pain 
meds prn. pt given judah meds. no aspirations noted. will continue to monitor.

## 2018-02-03 VITALS — SYSTOLIC BLOOD PRESSURE: 122 MMHG | DIASTOLIC BLOOD PRESSURE: 68 MMHG

## 2018-02-03 VITALS — DIASTOLIC BLOOD PRESSURE: 87 MMHG | SYSTOLIC BLOOD PRESSURE: 144 MMHG

## 2018-02-03 RX ADMIN — CYCLOBENZAPRINE SCH MG: 10 TABLET, FILM COATED ORAL at 17:27

## 2018-02-03 RX ADMIN — Medication SCH MG: at 08:12

## 2018-02-03 RX ADMIN — Medication PRN MG: at 22:49

## 2018-02-03 RX ADMIN — DOCUSATE SODIUM SCH MG: 100 CAPSULE, LIQUID FILLED ORAL at 21:21

## 2018-02-03 RX ADMIN — Medication SCH MG: at 12:42

## 2018-02-03 RX ADMIN — BACLOFEN SCH MG: 10 TABLET ORAL at 12:45

## 2018-02-03 RX ADMIN — LACTOBACILLUS ACIDOPH-L.BULGARICUS 1 MILLION CELL CHEWABLE TABLET SCH TAB.CHEW: at 08:12

## 2018-02-03 RX ADMIN — DEXAMETHASONE SODIUM PHOSPHATE SCH MG: 4 INJECTION, SOLUTION INTRAMUSCULAR; INTRAVENOUS at 21:23

## 2018-02-03 RX ADMIN — HYDROMORPHONE HYDROCHLORIDE PRN MG: 2 TABLET ORAL at 08:28

## 2018-02-03 RX ADMIN — DEXAMETHASONE SODIUM PHOSPHATE SCH MG: 4 INJECTION, SOLUTION INTRAMUSCULAR; INTRAVENOUS at 08:43

## 2018-02-03 RX ADMIN — HYDROMORPHONE HYDROCHLORIDE PRN MG: 2 TABLET ORAL at 18:43

## 2018-02-03 RX ADMIN — ZINC SULFATE CAP 220 MG (50 MG ELEMENTAL ZN) SCH MG: 220 (50 ZN) CAP at 08:12

## 2018-02-03 RX ADMIN — DOCUSATE SODIUM SCH MG: 100 CAPSULE, LIQUID FILLED ORAL at 08:12

## 2018-02-03 RX ADMIN — BACLOFEN SCH MG: 10 TABLET ORAL at 08:12

## 2018-02-03 RX ADMIN — CYCLOBENZAPRINE SCH MG: 10 TABLET, FILM COATED ORAL at 21:22

## 2018-02-03 RX ADMIN — TOLTERODINE TARTRATE SCH MG: 2 CAPSULE, EXTENDED RELEASE ORAL at 08:13

## 2018-02-03 RX ADMIN — RIVAROXABAN SCH MG: 10 TABLET, FILM COATED ORAL at 17:32

## 2018-02-03 RX ADMIN — HYDROMORPHONE HYDROCHLORIDE PRN MG: 2 TABLET ORAL at 22:52

## 2018-02-03 RX ADMIN — PANTOPRAZOLE SODIUM SCH MG: 40 TABLET, DELAYED RELEASE ORAL at 06:51

## 2018-02-03 RX ADMIN — LACTOBACILLUS ACIDOPH-L.BULGARICUS 1 MILLION CELL CHEWABLE TABLET SCH TAB.CHEW: at 21:23

## 2018-02-03 RX ADMIN — Medication SCH MG: at 06:51

## 2018-02-03 RX ADMIN — Medication SCH MG: at 17:31

## 2018-02-03 RX ADMIN — Medication SCH MG: at 12:45

## 2018-02-03 RX ADMIN — THERA TABS SCH UDTAB: TAB at 08:11

## 2018-02-03 RX ADMIN — SENNOSIDES SCH TAB: 8.6 TABLET, COATED ORAL at 21:21

## 2018-02-03 RX ADMIN — BACLOFEN SCH MG: 10 TABLET ORAL at 17:27

## 2018-02-03 RX ADMIN — Medication SCH MG: at 17:27

## 2018-02-03 RX ADMIN — HYDROMORPHONE HYDROCHLORIDE PRN MG: 2 TABLET ORAL at 13:59

## 2018-02-03 RX ADMIN — Medication SCH MG: at 17:30

## 2018-02-03 RX ADMIN — Medication SCH MG: at 08:11

## 2018-02-03 RX ADMIN — CYCLOBENZAPRINE SCH MG: 10 TABLET, FILM COATED ORAL at 08:12

## 2018-02-03 RX ADMIN — CYCLOBENZAPRINE SCH MG: 10 TABLET, FILM COATED ORAL at 12:45

## 2018-02-03 NOTE — NUR
Pt slept intermittently at night.  visited last night. Meds given as ordered. All 
needs attended to promptly. Will endorse to day shift RN. Continue to monitor.

## 2018-02-03 NOTE — NUR
Pt resting comfortably in bed. AAO x4. No acute distress noted. C/o pain on the back, . 
Pinzon catheter intact and draining clear yellow colored urine. Safety measures maintained. 
Bed alarm on. Call light and personal belongings within reach. Will continue to monitor.

## 2018-02-03 NOTE — NUR
Received pt in bed, AAO x 4 watching television. Verbally responsive and able to make needs 
known.no c/o pain noted,  Will continue to monitor. call light with in reach

## 2018-02-03 NOTE — NUR
aaox4 oob to bedside commode with assist. patient has an ORIF of the right hip.

needs attended. right hip dressing clean dry and intact. on pain management. 

denies any pain at this time. kept comfortable. On contact isolation, patient 

has ESBL in urine. Pinzon catheter intact draining yellow urine. I & O monitor. 

Will monitor patient. fall precautions maintained. call bell within reach. No

acute distress noted.

## 2018-02-04 VITALS — DIASTOLIC BLOOD PRESSURE: 87 MMHG | SYSTOLIC BLOOD PRESSURE: 136 MMHG

## 2018-02-04 VITALS — DIASTOLIC BLOOD PRESSURE: 84 MMHG | SYSTOLIC BLOOD PRESSURE: 133 MMHG

## 2018-02-04 VITALS — SYSTOLIC BLOOD PRESSURE: 136 MMHG | DIASTOLIC BLOOD PRESSURE: 87 MMHG

## 2018-02-04 RX ADMIN — DEXAMETHASONE SODIUM PHOSPHATE SCH MG: 4 INJECTION, SOLUTION INTRAMUSCULAR; INTRAVENOUS at 21:20

## 2018-02-04 RX ADMIN — HYDROMORPHONE HYDROCHLORIDE PRN MG: 2 TABLET ORAL at 08:21

## 2018-02-04 RX ADMIN — RIVAROXABAN SCH MG: 10 TABLET, FILM COATED ORAL at 17:07

## 2018-02-04 RX ADMIN — Medication SCH MG: at 17:05

## 2018-02-04 RX ADMIN — THERA TABS SCH UDTAB: TAB at 08:14

## 2018-02-04 RX ADMIN — SENNOSIDES SCH TAB: 8.6 TABLET, COATED ORAL at 21:20

## 2018-02-04 RX ADMIN — Medication SCH MG: at 00:07

## 2018-02-04 RX ADMIN — HYDROMORPHONE HYDROCHLORIDE PRN MG: 2 TABLET ORAL at 21:59

## 2018-02-04 RX ADMIN — BACLOFEN SCH MG: 10 TABLET ORAL at 08:14

## 2018-02-04 RX ADMIN — Medication PRN MG: at 21:25

## 2018-02-04 RX ADMIN — Medication SCH MG: at 12:04

## 2018-02-04 RX ADMIN — Medication SCH MG: at 08:15

## 2018-02-04 RX ADMIN — CYCLOBENZAPRINE SCH MG: 10 TABLET, FILM COATED ORAL at 16:12

## 2018-02-04 RX ADMIN — Medication SCH MG: at 23:50

## 2018-02-04 RX ADMIN — CYCLOBENZAPRINE SCH MG: 10 TABLET, FILM COATED ORAL at 08:14

## 2018-02-04 RX ADMIN — HYDROMORPHONE HYDROCHLORIDE PRN MG: 2 TABLET ORAL at 17:49

## 2018-02-04 RX ADMIN — Medication SCH MG: at 12:03

## 2018-02-04 RX ADMIN — DOCUSATE SODIUM SCH MG: 100 CAPSULE, LIQUID FILLED ORAL at 21:20

## 2018-02-04 RX ADMIN — ZINC SULFATE CAP 220 MG (50 MG ELEMENTAL ZN) SCH MG: 220 (50 ZN) CAP at 08:14

## 2018-02-04 RX ADMIN — DEXAMETHASONE SODIUM PHOSPHATE SCH MG: 4 INJECTION, SOLUTION INTRAMUSCULAR; INTRAVENOUS at 08:25

## 2018-02-04 RX ADMIN — Medication SCH MG: at 16:12

## 2018-02-04 RX ADMIN — TOLTERODINE TARTRATE SCH MG: 2 CAPSULE, EXTENDED RELEASE ORAL at 08:15

## 2018-02-04 RX ADMIN — BACLOFEN SCH MG: 10 TABLET ORAL at 16:11

## 2018-02-04 RX ADMIN — LACTOBACILLUS ACIDOPH-L.BULGARICUS 1 MILLION CELL CHEWABLE TABLET SCH TAB.CHEW: at 21:20

## 2018-02-04 RX ADMIN — LACTOBACILLUS ACIDOPH-L.BULGARICUS 1 MILLION CELL CHEWABLE TABLET SCH TAB.CHEW: at 08:14

## 2018-02-04 RX ADMIN — Medication SCH MG: at 23:49

## 2018-02-04 RX ADMIN — Medication SCH MG: at 08:13

## 2018-02-04 RX ADMIN — Medication SCH MG: at 06:10

## 2018-02-04 RX ADMIN — CYCLOBENZAPRINE SCH MG: 10 TABLET, FILM COATED ORAL at 21:20

## 2018-02-04 RX ADMIN — DOCUSATE SODIUM SCH MG: 100 CAPSULE, LIQUID FILLED ORAL at 08:14

## 2018-02-04 RX ADMIN — PANTOPRAZOLE SODIUM SCH MG: 40 TABLET, DELAYED RELEASE ORAL at 06:10

## 2018-02-04 RX ADMIN — HYDROMORPHONE HYDROCHLORIDE PRN MG: 2 TABLET ORAL at 13:07

## 2018-02-04 RX ADMIN — CYCLOBENZAPRINE SCH MG: 10 TABLET, FILM COATED ORAL at 12:03

## 2018-02-04 RX ADMIN — BACLOFEN SCH MG: 10 TABLET ORAL at 12:03

## 2018-02-04 NOTE — NUR
slept at ;kasandra intervals. no acute distress noted. valencia catheter draining yellow

urine.needs attended. pain meds given as scheduled. contact isolation maintained.

## 2018-02-05 VITALS — DIASTOLIC BLOOD PRESSURE: 86 MMHG | SYSTOLIC BLOOD PRESSURE: 130 MMHG

## 2018-02-05 LAB
BUN SERPL-MCNC: 8 MG/DL (ref 7–18)
CHLORIDE SERPL-SCNC: 105 MMOL/L (ref 98–107)
CO2 SERPL-SCNC: 24 MMOL/L (ref 21–32)
CREAT SERPL-MCNC: 0.6 MG/DL (ref 0.6–1.3)
GLUCOSE SERPL-MCNC: 146 MG/DL (ref 74–106)
POTASSIUM SERPL-SCNC: 3.7 MMOL/L (ref 3.5–5.1)

## 2018-02-05 RX ADMIN — Medication SCH MG: at 06:12

## 2018-02-05 RX ADMIN — THERA TABS SCH UDTAB: TAB at 09:14

## 2018-02-05 RX ADMIN — Medication SCH MG: at 13:42

## 2018-02-05 RX ADMIN — TOLTERODINE TARTRATE SCH MG: 2 CAPSULE, EXTENDED RELEASE ORAL at 09:15

## 2018-02-05 RX ADMIN — DEXAMETHASONE SODIUM PHOSPHATE SCH MG: 4 INJECTION, SOLUTION INTRAMUSCULAR; INTRAVENOUS at 09:19

## 2018-02-05 RX ADMIN — DOCUSATE SODIUM SCH MG: 100 CAPSULE, LIQUID FILLED ORAL at 09:14

## 2018-02-05 RX ADMIN — Medication SCH MG: at 09:22

## 2018-02-05 RX ADMIN — BACLOFEN SCH MG: 10 TABLET ORAL at 12:06

## 2018-02-05 RX ADMIN — PANTOPRAZOLE SODIUM SCH MG: 40 TABLET, DELAYED RELEASE ORAL at 06:12

## 2018-02-05 RX ADMIN — CYCLOBENZAPRINE SCH MG: 10 TABLET, FILM COATED ORAL at 09:15

## 2018-02-05 RX ADMIN — CYCLOBENZAPRINE SCH MG: 10 TABLET, FILM COATED ORAL at 16:42

## 2018-02-05 RX ADMIN — BACLOFEN SCH MG: 10 TABLET ORAL at 09:14

## 2018-02-05 RX ADMIN — CYCLOBENZAPRINE SCH MG: 10 TABLET, FILM COATED ORAL at 12:06

## 2018-02-05 RX ADMIN — LACTOBACILLUS ACIDOPH-L.BULGARICUS 1 MILLION CELL CHEWABLE TABLET SCH TAB.CHEW: at 09:14

## 2018-02-05 RX ADMIN — HYDROMORPHONE HYDROCHLORIDE PRN MG: 2 TABLET ORAL at 16:05

## 2018-02-05 RX ADMIN — Medication SCH MG: at 16:42

## 2018-02-05 RX ADMIN — Medication SCH MG: at 12:04

## 2018-02-05 RX ADMIN — ZINC SULFATE CAP 220 MG (50 MG ELEMENTAL ZN) SCH MG: 220 (50 ZN) CAP at 09:14

## 2018-02-05 RX ADMIN — Medication SCH MG: at 09:14

## 2018-02-05 RX ADMIN — HYDROMORPHONE HYDROCHLORIDE PRN MG: 2 TABLET ORAL at 12:35

## 2018-02-05 RX ADMIN — BACLOFEN SCH MG: 10 TABLET ORAL at 16:42

## 2018-02-05 RX ADMIN — HYDROMORPHONE HYDROCHLORIDE PRN MG: 2 TABLET ORAL at 09:21

## 2018-02-05 RX ADMIN — Medication SCH MG: at 12:03

## 2018-02-05 NOTE — NUR
PT SET FOR DISCHARGE. EXPRESSED CONCERN RE: PAIN MEDICATION AVAILABILITY WHEN SENT HOME. 
EXPLAINED POLICY AND PROCEDURE. RECOMMENDED THAT  DROP OFF PRESCRIPTION AT PREFERRED 
HOME PHARMACY. PT STATED THAT SHE WILL STAY TILL HER NEXT DOSE OF DILAUDID 4MG PRN. NURSE 
MANAGER AND CHARGE NURSE IS AWARE

## 2018-02-05 NOTE — NUR
PT COMPLAINING OF PAIN 8/10. SCHEDULED OXYCODONE 20MG NOT AVAILABLE. REQUESTED FOR REFILL 
WITH [facility] IN PHARMACY. PT REQUESTED FOR DILAUDID 4MG PRN. PER MARVIN POLLARD TO ADMINISTER 1HR 
PRIOR TO NEXT AVAILABLE DOSE. CHARGE NURSE IS AWARE.

## 2018-02-05 NOTE — NUR
VS BP: 132/80, HR: 88, RESP: 18, TEMP: 97.8, O2: 99%. PT RECEIVED DILAUDID 4MG PRN PER NURSE 
MANAGER APPROVAL DT PAIN 8/10. WILL CONTINUE TO MONITOR.

## 2018-02-05 NOTE — NUR
RECEIVED PT AWAKE, ALERT AND ORIENTED X4. VS BP: 125/78, HR: 76, RESP: 18, TEMP: 97.8, O2: 
98% ROOM AIR, PAIN: 4/10. NO SOB. COMPLETED SKIN ASSESSMENT. PLEASE SEE CHART. PT WAS 
DISCHARGE WITH TEACHING NOTES AND FOLLOW UP APPOINTMENTS. NO FURTHER CONCERNS NOTED.  
PICKED UP PT.

## 2018-02-05 NOTE — NUR
slept well. no acute distress noted. needs attended. kept comfortable.

making needs known. tolerated po meds well. pain meds given as needed.

valencia catheter draining yellow urine. I & O monitor.

## 2018-02-07 NOTE — NUR
RN NOTES

PATIENT RECEIVED FROM E.R. DEPARTMENT, ALERT AND ORIENTED X4, C/O BACK PAIN ESPECIALLY 
DURING MOVEMENT. PATIENT KEPT COMFORTABLE, NO S/SX OF RESP DISTRESS. INFORMED DR. SHIPMAN 
OF ADMISSION, AWAITING FOR ORDERS. WILL ENDORSE TO NIGHT SHIFT FOR LAVONNE.

## 2018-02-07 NOTE — NUR
A MESSAGE WAS SENT TO DR. SHIPMAN FOR ORDERS FOR PAIN MEDICATION AND NICOTINE PATCH. WILL 
CONT TO F/U.

## 2018-02-07 NOTE — NUR
RN OPENING NOTE;



RECEIVED PT IN BED W/ FAMILY AT THE BED SIDE. VERY ANXIOUS AND RESTLESS. COMPLAINING OF BACK 
PAIN AND REQUESTING PAIN MEDICATION. BREATHING EVENLY. NO SOB. F/C IN PLACE DRAINING CLEAR 
YELLOW URINE. CALL LIGHT WITHIN REACH. WILL CONT TO MONITOR AND WILL F/U W/ MD'S ORDER AND 
PAIN MANAGEMENT.

## 2018-02-07 NOTE — NUR
BELKIS 889 FROM HOME FOR BACK PAIN WITH HISTORY "BULGING DISK." PT. TOOK 4MG 
DILAUDID AND 20 MG OXYCODONE AT HOME WITH NO RELIEF.  PATIENT IS A/OX 4. 
BREATHING EVEN AND UNLABORED. NO SOB. VITALS STABLE. SAFETY AND COMFORT 
MEASURES IN PLACE. AWAITING MD ORDERS.

## 2018-02-08 NOTE — NUR
PT RECEIVED



RESTING COMFORTABLY IN BED. NO S/S OR C/O DISTRESS NOTED. SIDE RAILS UP X2, CALL LIGHT LEFT 
WITHIN REACH. WILL CONTINUE PLAN OF CARE.

## 2018-02-08 NOTE — NUR
PT IN BED AWAKE, BREATHING EVENLY. W/ INTERMITTENT BACK PAIN. PAIN MEDS GIVEN AS ORDERED PER 
PT'S REQUEST. RESTLESS W/ POOR SLEEP PATTERN DURING THE NIGHT, NEEDS ATTENDED . ASSISTED W/ 
ADLS. CALL LIGHT WITHIN REACH. WILL CONT TO MONITOR AND WILL ENDORSE TO AM SHIFT FOR LAVONNE .

## 2018-02-08 NOTE — NUR
CHANGE OF SHIFT REPORT



PT RESTING COMFORTABLY IN BED. NO S/S OR C/O PAIN OR DISTRESS NOTED. SIDE RAILS UP X2, CALL 
LIGHT LEFT WITHIN REACH. PT KEPT CLEAN, DRY, AND COMFORTABLE. NO SIGNIFICANT CHANGES SINCE 
PREVIOUS SHIFT. WILL GIVE REPORT TO NOC RN.

## 2018-02-08 NOTE — NUR
MS RN NOTE:



PATIENT RESTING IN BED, A/OX4. STABLE, NO ACUTE DISTRESS NOTED. BREATHING EVEN AND 
UNLABORED, NO SOB NOTED. BED LOCKED AND IN LOWEST POSITION, CALL LIGHT IN REACH. WILL 
CONTINUE TO MONITOR.

## 2018-02-08 NOTE — NUR
MS RN NOTES: MEDICATION



PATIENT REQUEST TO TAKE MEDICATION OF HER OXYCONTIN PER PT SHE'S REALLY IN PAIN, NON 
PHARMACOLOGICAL IMPLEMENTED INEFFECTIVE 10-10 VS STABLE WILL GIVE MEDICATION PER ORDERED.

## 2018-02-09 NOTE — NUR
MS RN NOTE:



PATIENT RESTING IN BED, A/OX 4. IN STABLE, NO ACUTE DISTRESS NOTED. BREATHING EVEN AND 
UNLABORED, NO SOB NOTED. BED LOCKED AND IN LOWEST POSITION, CALL LIGHT IN REACH. WILL 
CONTINUE TO MONITOR.

## 2018-02-09 NOTE — NUR
MS RN CLOSING NOTES



PT COMFORTABLY ASLEEP AND EASILY AWAKEN, TOLERATING ROOM AIR 02 SAT 98% IN STABLE CONDITION. 
RESPIRATION EVEN AND UNLABORED. KEPT CLEAN AND DRY AND COMFORTABLE, ALL NURSING CARE 
RENDERED. NEEDS ATTENDED AND ANTICIPATED, FREQUENT VISUAL CHECK DONE FOR SAFETY EVERY 2 
HOURS. NO COMPLAINS OF PAIN. GOOD SKIN CARE PROVIDED, ASSISTED REPOSITION Q2H.  ON LOW BED 
AT ALL TIMES TO ENSURE SAFETY. SAFE HAZARD FREE ENVIRONMENT PROVIDED. CALL LIGHT WITHIN EASY 
TO REACH. WILL ENDORSE NEXT SHIFT CONTINUITY OF CARE

## 2018-02-09 NOTE — NUR
RN OPEN NOTES



RECEIVED REPORT FROM NIGHT SHIFT NURSE. PATIENT IS IN BED, AWAKE, ALERT AND ORIENTED TO 
NAME, PLACE AND TIME. NO SIGNS AND SYMPTOMS OF DISTRESS. BED IN LOW POSITION, LOCKED AND TWO 
SIDE RAILS FOR SAFETY. CALL LIGHT WITHIN REACH FOR SAFETY. WILL CONTINUE TO ASSESS AND 
MONITOR PATIENT

## 2018-02-09 NOTE — NUR
RN CLOSING NOTES



PT COMFORTABLY IN BED.  AT BEDSIDE. TOLERATING ROOM AIR 02 SAT 95% IN STABLE 
CONDITION. RESPIRATION EVEN AND UNLABORED. KEPT CLEAN AND DRY AND COMFORTABLE, ALL NURSING 
CARE RENDERED. NEEDS ATTENDED AND ANTICIPATED, FREQUENT VISUAL CHECK DONE FOR SAFETY EVERY 2 
HOURS. TX AS ORDERED, GOOD SKIN CARE PROVIDED. REPOSITION Q2H.  BED IN LOW POSITION, LOCKED 
AND TWO SIDE RAILS ARE UP ON AT ALL TIMES TO ENSURE SAFETY. SAFE HAZARD FREE ENVIRONMENT 
PROVIDED. CALL LIGHT WITHIN EASY TO REACH. WILL ENDORSE NEXT SHIFT CONTINUITY OF CARE

## 2018-02-10 NOTE — NUR
RECEIVED PATIENT A/O X4, COOPERATIVE, ANXIOUS AT TIMES. PATIENT IS IN BED. ABLE TU TURN FROM 
SIDE TO SIDE AND MOVE THE TOES, UNABLE TO AMBULATE. BED IS LOCKED IN LOWEST POSITION, SIDE 
RAILS UP X2, CALL LIGHT WITHIN REACH. EDUCATED THE PATIENT TO USE THE CALL LIGHT TO CALL FOR 
ASSISTANCE. VERBALIZED UNDERSTANDING. ALL BELONGINGS WITHIN REACH. ALL NEEDS ARE MET. WILL 
CONTINUE TO ASSESS/MONITOR THROUGHOUT THE SHIFT.

## 2018-02-10 NOTE — NUR
DISCHARGE ORDER RECEIVED. DISCHARGE EDUCATION PROVIDED TO THE PATIENT. IV CATHETER REMOVED 
WITH THE TIP INTACT. OCLUSIVE DRESSING APPLIED. PATIENT'S  ASH IS HERE TO  
THE PATIENT. ALL BELONGINGS ACCOUNTED FOR. PATIENT CAME WITH THE [facility] CATHETER DUE TO 
NEUROGENIC BLADDER AND IS TO LEAVE THE HOSPITAL WITH THE [facility]. PATIENT STATED SHE FOLLOW 
UPS WITH THE OUTPATIENT UROLOGIST FOR MANAGEMENT OF THE CATHETER CARE. PATIENT VERBALIZED 
UNDERSTANDING OF DISCHARGE INSTRUCTIONS.

## 2018-02-10 NOTE — NUR
MS RN CLOSING NOTES



PT TOLERATING ROOM AIR 02 SAT 99% PT ASLEEP AND EASILY AWAKEN, NO S/S OF DISTRESS, STABLE 
CONDITION. RESPIRATION EVEN AND UNLABORED. TX AS ORDERED, GOOD SKIN CARE PROVIDED. ALL 
NURSING CARE RENDERED. NEEDS ATTENDED AND ANTICIPATED, KEPT CLEAN AND DRY AND COMFORTABLE, 
FREQUENT VISUAL CHECK DONE FOR SAFETY EVERY 2 HOURS. ASSISTED REPOSITION Q2H. ON LOW BED AT 
ALL TIMES TO ENSURE SAFETY. SAFE HAZARD FREE ENVIRONMENT PROVIDED. CALL LIGHT WITHIN EASY TO 
REACH. WILL ENDORSE NEXT SHIFT CONTINUITY OF CARE

## 2018-03-13 ENCOUNTER — HOSPITAL ENCOUNTER (OUTPATIENT)
Dept: HOSPITAL 54 - MSC | Age: 53
Discharge: HOME | End: 2018-03-13
Attending: ANESTHESIOLOGY
Payer: COMMERCIAL

## 2018-03-13 DIAGNOSIS — G89.29: Primary | ICD-10-CM

## 2018-03-13 DIAGNOSIS — G82.20: ICD-10-CM

## 2018-03-13 DIAGNOSIS — G95.89: ICD-10-CM

## 2018-03-13 DIAGNOSIS — M96.1: ICD-10-CM

## 2018-03-13 DIAGNOSIS — M62.830: ICD-10-CM

## 2018-03-13 DIAGNOSIS — F11.20: ICD-10-CM

## 2018-03-13 DIAGNOSIS — T14.90XS: ICD-10-CM

## 2018-03-13 DIAGNOSIS — M47.26: ICD-10-CM

## 2018-03-13 DIAGNOSIS — M54.5: ICD-10-CM

## 2018-03-13 DIAGNOSIS — M25.9: ICD-10-CM

## 2018-03-13 DIAGNOSIS — Z98.1: ICD-10-CM

## 2018-03-13 DIAGNOSIS — M47.27: ICD-10-CM

## 2018-05-01 ENCOUNTER — HOSPITAL ENCOUNTER (OUTPATIENT)
Dept: HOSPITAL 54 - MSC | Age: 53
Discharge: HOME | End: 2018-05-01
Attending: ANESTHESIOLOGY
Payer: COMMERCIAL

## 2018-05-01 DIAGNOSIS — M47.26: ICD-10-CM

## 2018-05-01 DIAGNOSIS — G47.00: ICD-10-CM

## 2018-05-01 DIAGNOSIS — M62.830: ICD-10-CM
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
## 2018-05-01 DIAGNOSIS — M25.9: ICD-10-CM

## 2018-05-01 DIAGNOSIS — M51.26: ICD-10-CM

## 2018-05-01 DIAGNOSIS — M96.1: ICD-10-CM

## 2018-05-01 DIAGNOSIS — M47.27: ICD-10-CM
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
## 2018-05-01 DIAGNOSIS — G95.89: Primary | ICD-10-CM

## 2018-05-01 DIAGNOSIS — Z79.891: ICD-10-CM

## 2018-05-01 DIAGNOSIS — Z99.3: ICD-10-CM

## 2018-05-01 DIAGNOSIS — T14.90XS: ICD-10-CM

## 2018-05-22 ENCOUNTER — HOSPITAL ENCOUNTER (OUTPATIENT)
Dept: HOSPITAL 54 - MSC | Age: 53
Discharge: HOME | End: 2018-05-22
Attending: ANESTHESIOLOGY
Payer: COMMERCIAL

## 2018-05-22 DIAGNOSIS — T14.90XS: ICD-10-CM
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
## 2018-05-22 DIAGNOSIS — G89.29: ICD-10-CM

## 2018-05-22 DIAGNOSIS — M47.27: ICD-10-CM

## 2018-05-22 DIAGNOSIS — Z79.891: ICD-10-CM

## 2018-05-22 DIAGNOSIS — M96.1: ICD-10-CM

## 2018-05-22 DIAGNOSIS — M51.26: ICD-10-CM

## 2018-05-22 DIAGNOSIS — M47.26: ICD-10-CM

## 2018-05-22 DIAGNOSIS — M25.9: ICD-10-CM

## 2018-05-22 DIAGNOSIS — G62.9: ICD-10-CM

## 2018-05-22 DIAGNOSIS — Z98.1: ICD-10-CM

## 2018-05-22 DIAGNOSIS — G82.20: ICD-10-CM

## 2018-05-22 DIAGNOSIS — G47.00: ICD-10-CM

## 2018-05-22 DIAGNOSIS — G95.89: Primary | ICD-10-CM

## 2018-06-05 ENCOUNTER — HOSPITAL ENCOUNTER (OUTPATIENT)
Dept: HOSPITAL 54 - MSC | Age: 53
Discharge: HOME | End: 2018-06-05
Attending: ANESTHESIOLOGY
Payer: COMMERCIAL

## 2018-06-05 DIAGNOSIS — F11.20: ICD-10-CM

## 2018-06-05 DIAGNOSIS — T14.90XS: ICD-10-CM

## 2018-06-05 DIAGNOSIS — Z99.3: ICD-10-CM
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
## 2018-06-05 DIAGNOSIS — M47.27: ICD-10-CM

## 2018-06-05 DIAGNOSIS — M62.830: ICD-10-CM

## 2018-06-05 DIAGNOSIS — G95.89: ICD-10-CM

## 2018-06-05 DIAGNOSIS — G47.00: ICD-10-CM

## 2018-06-05 DIAGNOSIS — G82.20: ICD-10-CM

## 2018-06-05 DIAGNOSIS — M25.9: ICD-10-CM

## 2018-06-05 DIAGNOSIS — M47.26: ICD-10-CM

## 2018-06-05 DIAGNOSIS — M51.26: Primary | ICD-10-CM

## 2018-06-05 DIAGNOSIS — G89.29: ICD-10-CM

## 2018-06-05 DIAGNOSIS — M96.1: ICD-10-CM

## 2018-07-03 ENCOUNTER — HOSPITAL ENCOUNTER (OUTPATIENT)
Dept: HOSPITAL 54 - MSC | Age: 53
Discharge: HOME | End: 2018-07-03
Attending: ANESTHESIOLOGY
Payer: COMMERCIAL

## 2018-07-03 DIAGNOSIS — Z98.1: ICD-10-CM

## 2018-07-03 DIAGNOSIS — M96.1: ICD-10-CM

## 2018-07-03 DIAGNOSIS — Z88.0: ICD-10-CM

## 2018-07-03 DIAGNOSIS — M25.9: ICD-10-CM

## 2018-07-03 DIAGNOSIS — M47.27: ICD-10-CM

## 2018-07-03 DIAGNOSIS — G82.20: ICD-10-CM

## 2018-07-03 DIAGNOSIS — M62.830: ICD-10-CM

## 2018-07-03 DIAGNOSIS — G95.89: Primary | ICD-10-CM

## 2018-07-03 DIAGNOSIS — M51.26: ICD-10-CM

## 2018-07-03 DIAGNOSIS — M47.26: ICD-10-CM

## 2018-07-03 DIAGNOSIS — F11.20: ICD-10-CM
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
## 2018-07-03 DIAGNOSIS — T14.90XS: ICD-10-CM

## 2018-07-03 DIAGNOSIS — G47.00: ICD-10-CM
